# Patient Record
Sex: FEMALE | HISPANIC OR LATINO | Employment: UNEMPLOYED | URBAN - METROPOLITAN AREA
[De-identification: names, ages, dates, MRNs, and addresses within clinical notes are randomized per-mention and may not be internally consistent; named-entity substitution may affect disease eponyms.]

---

## 2020-09-08 ENCOUNTER — HOSPITAL ENCOUNTER (EMERGENCY)
Facility: CLINIC | Age: 50
Discharge: HOME | End: 2020-09-08
Attending: EMERGENCY MEDICINE | Admitting: EMERGENCY MEDICINE
Payer: COMMERCIAL

## 2020-09-08 ENCOUNTER — APPOINTMENT (OUTPATIENT)
Dept: CT IMAGING | Facility: CLINIC | Age: 50
End: 2020-09-08
Attending: EMERGENCY MEDICINE
Payer: COMMERCIAL

## 2020-09-08 VITALS
RESPIRATION RATE: 20 BRPM | DIASTOLIC BLOOD PRESSURE: 89 MMHG | WEIGHT: 240 LBS | OXYGEN SATURATION: 100 % | SYSTOLIC BLOOD PRESSURE: 148 MMHG | HEART RATE: 80 BPM | TEMPERATURE: 97.3 F

## 2020-09-08 DIAGNOSIS — N13.30 HYDRONEPHROSIS, UNSPECIFIED HYDRONEPHROSIS TYPE: ICD-10-CM

## 2020-09-08 DIAGNOSIS — N23 RENAL COLIC: ICD-10-CM

## 2020-09-08 LAB
ALBUMIN SERPL-MCNC: 4.1 G/DL (ref 3.4–5)
ALBUMIN UR-MCNC: NEGATIVE MG/DL
ALP SERPL-CCNC: 113 U/L (ref 40–150)
ALT SERPL W P-5'-P-CCNC: 24 U/L (ref 0–50)
ANION GAP SERPL CALCULATED.3IONS-SCNC: 6 MMOL/L (ref 3–14)
ANISOCYTOSIS BLD QL SMEAR: ABNORMAL
APPEARANCE UR: CLEAR
AST SERPL W P-5'-P-CCNC: 24 U/L (ref 0–45)
BACTERIA #/AREA URNS HPF: ABNORMAL /HPF
BASOPHILS # BLD AUTO: 0 10E9/L (ref 0–0.2)
BASOPHILS NFR BLD AUTO: 0.3 %
BILIRUB SERPL-MCNC: 0.3 MG/DL (ref 0.2–1.3)
BILIRUB UR QL STRIP: NEGATIVE
BUN SERPL-MCNC: 8 MG/DL (ref 7–30)
CALCIUM SERPL-MCNC: 8.8 MG/DL (ref 8.5–10.1)
CHLORIDE SERPL-SCNC: 107 MMOL/L (ref 94–109)
CO2 SERPL-SCNC: 25 MMOL/L (ref 20–32)
COLOR UR AUTO: ABNORMAL
CREAT SERPL-MCNC: 0.49 MG/DL (ref 0.52–1.04)
DIFFERENTIAL METHOD BLD: ABNORMAL
ELLIPTOCYTES BLD QL SMEAR: SLIGHT
EOSINOPHIL # BLD AUTO: 0.1 10E9/L (ref 0–0.7)
EOSINOPHIL NFR BLD AUTO: 0.7 %
ERYTHROCYTE [DISTWIDTH] IN BLOOD BY AUTOMATED COUNT: 20.9 % (ref 10–15)
GFR SERPL CREATININE-BSD FRML MDRD: >90 ML/MIN/{1.73_M2}
GLUCOSE SERPL-MCNC: 138 MG/DL (ref 70–99)
GLUCOSE UR STRIP-MCNC: NEGATIVE MG/DL
HCT VFR BLD AUTO: 38 % (ref 35–47)
HGB BLD-MCNC: 10.7 G/DL (ref 11.7–15.7)
HGB UR QL STRIP: NEGATIVE
HYPOCHROMIA BLD QL: PRESENT
IMM GRANULOCYTES # BLD: 0.1 10E9/L (ref 0–0.4)
IMM GRANULOCYTES NFR BLD: 0.5 %
KETONES UR STRIP-MCNC: NEGATIVE MG/DL
LEUKOCYTE ESTERASE UR QL STRIP: NEGATIVE
LYMPHOCYTES # BLD AUTO: 1.6 10E9/L (ref 0.8–5.3)
LYMPHOCYTES NFR BLD AUTO: 13.1 %
MCH RBC QN AUTO: 20.4 PG (ref 26.5–33)
MCHC RBC AUTO-ENTMCNC: 28.2 G/DL (ref 31.5–36.5)
MCV RBC AUTO: 72 FL (ref 78–100)
MICROCYTES BLD QL SMEAR: PRESENT
MONOCYTES # BLD AUTO: 0.6 10E9/L (ref 0–1.3)
MONOCYTES NFR BLD AUTO: 4.9 %
MUCOUS THREADS #/AREA URNS LPF: PRESENT /LPF
NEUTROPHILS # BLD AUTO: 9.5 10E9/L (ref 1.6–8.3)
NEUTROPHILS NFR BLD AUTO: 80.5 %
NITRATE UR QL: NEGATIVE
NRBC # BLD AUTO: 0 10*3/UL
NRBC BLD AUTO-RTO: 0 /100
PH UR STRIP: 7 PH (ref 5–7)
PLATELET # BLD AUTO: 434 10E9/L (ref 150–450)
PLATELET # BLD EST: ABNORMAL 10*3/UL
POTASSIUM SERPL-SCNC: 3.3 MMOL/L (ref 3.4–5.3)
PROT SERPL-MCNC: 8.2 G/DL (ref 6.8–8.8)
RBC # BLD AUTO: 5.25 10E12/L (ref 3.8–5.2)
RBC #/AREA URNS AUTO: 2 /HPF (ref 0–2)
SODIUM SERPL-SCNC: 138 MMOL/L (ref 133–144)
SOURCE: ABNORMAL
SP GR UR STRIP: 1.01 (ref 1–1.03)
SQUAMOUS #/AREA URNS AUTO: 1 /HPF (ref 0–1)
UROBILINOGEN UR STRIP-MCNC: NORMAL MG/DL (ref 0–2)
WBC # BLD AUTO: 11.8 10E9/L (ref 4–11)
WBC #/AREA URNS AUTO: 1 /HPF (ref 0–5)

## 2020-09-08 PROCEDURE — 87086 URINE CULTURE/COLONY COUNT: CPT | Performed by: EMERGENCY MEDICINE

## 2020-09-08 PROCEDURE — 99285 EMERGENCY DEPT VISIT HI MDM: CPT | Mod: 25

## 2020-09-08 PROCEDURE — 25000128 H RX IP 250 OP 636: Performed by: EMERGENCY MEDICINE

## 2020-09-08 PROCEDURE — 25800030 ZZH RX IP 258 OP 636: Performed by: EMERGENCY MEDICINE

## 2020-09-08 PROCEDURE — 96361 HYDRATE IV INFUSION ADD-ON: CPT

## 2020-09-08 PROCEDURE — 96374 THER/PROPH/DIAG INJ IV PUSH: CPT

## 2020-09-08 PROCEDURE — 80053 COMPREHEN METABOLIC PANEL: CPT | Performed by: EMERGENCY MEDICINE

## 2020-09-08 PROCEDURE — 96375 TX/PRO/DX INJ NEW DRUG ADDON: CPT

## 2020-09-08 PROCEDURE — 85025 COMPLETE CBC W/AUTO DIFF WBC: CPT | Performed by: EMERGENCY MEDICINE

## 2020-09-08 PROCEDURE — 74176 CT ABD & PELVIS W/O CONTRAST: CPT

## 2020-09-08 PROCEDURE — 81001 URINALYSIS AUTO W/SCOPE: CPT | Performed by: EMERGENCY MEDICINE

## 2020-09-08 RX ORDER — IBUPROFEN 600 MG/1
600 TABLET, FILM COATED ORAL EVERY 6 HOURS PRN
Qty: 30 TABLET | Refills: 1 | Status: ON HOLD | OUTPATIENT
Start: 2020-09-08 | End: 2020-12-16

## 2020-09-08 RX ORDER — ONDANSETRON 4 MG/1
4 TABLET, ORALLY DISINTEGRATING ORAL EVERY 8 HOURS PRN
Qty: 10 TABLET | Refills: 0 | Status: SHIPPED | OUTPATIENT
Start: 2020-09-08 | End: 2020-09-11

## 2020-09-08 RX ORDER — MORPHINE SULFATE 4 MG/ML
4 INJECTION, SOLUTION INTRAMUSCULAR; INTRAVENOUS
Status: DISCONTINUED | OUTPATIENT
Start: 2020-09-08 | End: 2020-09-08 | Stop reason: HOSPADM

## 2020-09-08 RX ORDER — ONDANSETRON 2 MG/ML
4 INJECTION INTRAMUSCULAR; INTRAVENOUS
Status: COMPLETED | OUTPATIENT
Start: 2020-09-08 | End: 2020-09-08

## 2020-09-08 RX ORDER — KETOROLAC TROMETHAMINE 15 MG/ML
15 INJECTION, SOLUTION INTRAMUSCULAR; INTRAVENOUS ONCE
Status: COMPLETED | OUTPATIENT
Start: 2020-09-08 | End: 2020-09-08

## 2020-09-08 RX ORDER — SODIUM CHLORIDE 9 MG/ML
INJECTION, SOLUTION INTRAVENOUS CONTINUOUS
Status: DISCONTINUED | OUTPATIENT
Start: 2020-09-08 | End: 2020-09-08 | Stop reason: HOSPADM

## 2020-09-08 RX ADMIN — ONDANSETRON 4 MG: 2 INJECTION INTRAMUSCULAR; INTRAVENOUS at 06:30

## 2020-09-08 RX ADMIN — KETOROLAC TROMETHAMINE 15 MG: 15 INJECTION, SOLUTION INTRAMUSCULAR; INTRAVENOUS at 07:50

## 2020-09-08 RX ADMIN — MORPHINE SULFATE 4 MG: 4 INJECTION INTRAVENOUS at 06:31

## 2020-09-08 RX ADMIN — SODIUM CHLORIDE 1000 ML: 9 INJECTION, SOLUTION INTRAVENOUS at 06:34

## 2020-09-08 ASSESSMENT — ENCOUNTER SYMPTOMS
NAUSEA: 1
CONSTIPATION: 0
BLOOD IN STOOL: 0
HEMATURIA: 0
DIARRHEA: 0
CHILLS: 0
FLANK PAIN: 1
VOMITING: 1
DYSURIA: 1
FEVER: 0

## 2020-09-08 NOTE — ED TRIAGE NOTES
Pt arrives with right flank pain that started 2 hours ago with nausea and vomiting.  Pain with urination as well that started this morning

## 2020-09-08 NOTE — ED PROVIDER NOTES
History     Chief Complaint:  Flank Pain       The history is provided by the patient.     Kathy Gaspar is a 50 year old female who presents for evaluation of acute onset of right flank pain radiating to her right abdomen.  She notes associated dysuria.  She denies hematuria or foul odor.  She notes nausea and vomiting when the pain becomes severe.  She denies fever or chills.  She notes the days leading up to this morning were normal for her.  She denies stool changes or vaginal symptoms.  She denies history of musculoskeletal back problems.  She denies history of similar pain in the past.  She has not had kidney stones or recurrent urinary tract infections.  She denies heavy lifting or straining or any trauma or injury.    Allergies:  No Known Drug Allergies    Medications:   The patient is not currently taking any prescribed medications.     Medical History:   Iron deficiency anemia    Surgical History   Appendectomy    Family History:   Mother -  Diabetes mellitus - type 2   Sister -  Diabetes mellitus - type 2     Social History:  The patient was unaccompanied to the ED.  Smoking Status: Never  Smokeless Tobacco: Never  Alcohol Use: No      Review of Systems   Constitutional: Negative for chills and fever.   Gastrointestinal: Positive for nausea and vomiting. Negative for blood in stool, constipation and diarrhea.   Genitourinary: Positive for dysuria and flank pain (right-sided). Negative for hematuria, vaginal bleeding, vaginal discharge and vaginal pain.        Negative for urine odor   All other systems reviewed and are negative.      Physical Exam     Patient Vitals for the past 24 hrs:   BP Temp Temp src Pulse Resp SpO2 Weight   09/08/20 0601 (!) 151/135 97.3  F (36.3  C) Temporal 74 20 100 % 108.9 kg (240 lb)          Physical Exam  General: Uncomfortable appearing adult female sitting upright  Eyes: PERRL, Conjunctive within normal limits  ENT: Moist mucous membranes, oropharynx clear.   CV:  Normal S1S2, no murmur, rub or gallop. Regular rate and rhythm  Resp: Clear to auscultation bilaterally, no wheezes, rales or rhonchi. Normal respiratory effort.  GI: Abdomen is soft, nontender and nondistended. No palpable masses. No rebound or guarding. CVA tenderness on the right to percussion.  MSK: No edema. Nontender. Normal active range of motion. No back tenderness to palpation.  Skin: Warm and dry. No rashes or lesions or ecchymoses on visible skin.  Neuro: Alert and oriented. Responds appropriately to all questions and commands. No focal findings appreciated. Normal muscle tone.  Psych: Appropriate mood and affect.     Emergency Department Course     Imaging:  Radiology results were communicated with the patient who voiced understanding of the findings.    CT Abdomen Pelvis w/o contrast:  IMPRESSION:   1.  Mild hydronephrosis and stranding on the right. Presumably related   to a 2 mm stone that is now in the bladder. Ultimately pyelonephritis   is not excluded, correlation with any signs of infection would be   needed to help exclude this.   2.  Otherwise no acute process demonstrated.   3.  Tiny hiatal hernia.   Reading per radiology.    Laboratory:  Laboratory findings were communicated with the patient who voiced understanding of the findings.    CBC: WBC 11.8 (H), HGB 10.7 (L),   CMP: Potassium 3.3 (L), Glucose 138 (H), Creatinine 0.49 (L) o/w WNL     UA with Microscopic: Bacteria few (A), Mucous present (A), o/w WNL   Urine Culture: Pending    Interventions:   0630 Zofran 4 mg IV   0631 Morphine 4 mg IV   0634 Normal Saline 1000 mL IV   0750 Toradol 15 mg IV     Emergency Department Course:    0611 Nursing notes and vitals reviewed.    0611 I performed an exam of the patient as documented above.     0623 IV was inserted and blood was drawn for laboratory testing, results above.    0648 The patient provided a urine sample here in the emergency department. This was sent for laboratory testing,  findings above.     0652 The patient was sent for CT while in the emergency department, results above.     Patient reassessed. She is feeling more comfortable, although notes mild ongoing pain.     0750 Findings and plan explained to the Patient. Patient discharged home with instructions regarding supportive care, medications, and reasons to return. The importance of close follow-up was reviewed. The patient was prescribed as below.     Impression & Plan     Medical Decision Making:  Kathy Gaspar is a 50 year old female who presented with acute onset of right flank pain radiating to her abdomen.  Differential included nephrolithiasis, pyelonephritis, AAA, appendicitis, diverticulitis, torsion.  CT confirms a 2mm bladder stone, presumably recently passed due to ongoing mild hydronephrosis and edema.  No clinical evidence of sepsis, urinary retention, renal failure, urinary tract infection, or pyelonephritis.  Urine culture pending due to bacteruria but active infection seems unlikely.  Her symptoms have been controlled with described interventions in the Emergency Department.     I have explained the importance of short term NSAID use in the management of this process. If this is a recently passed stone her symptoms should improve over the day. Zofran was prescribed to help ease her nausea from pain.  Opioids were not prescribed as the patient did not feel that she would require stronger pain control at home.    She understands to return to the ED with uncontrolled pain, vomiting, and fever. PCP and/or urology follow-up in the next 2 to 3 days with ongoing symptoms recommended.    Diagnosis:     ICD-10-CM    1. Renal colic  N23    2. Hydronephrosis, unspecified hydronephrosis type  N13.30         Disposition:  Discharged to home.    Discharge Medications:  New Prescriptions    IBUPROFEN (ADVIL/MOTRIN) 600 MG TABLET    Take 1 tablet (600 mg) by mouth every 6 hours as needed for moderate pain    ONDANSETRON  (ZOFRAN ODT) 4 MG ODT TAB    Take 1 tablet (4 mg) by mouth every 8 hours as needed for nausea or vomiting       Scribe Disclosure:  I, Deedee Krishnamurthy, am serving as a scribe at 6:12 AM on 9/8/2020 to document services personally performed by Brittany Hanna MD based on my observations and the provider's statements to me.        Brittany Hanna MD  09/08/20 0847

## 2020-09-08 NOTE — ED AVS SNAPSHOT
[facility] Emergency Department  [address]  [facility] [address]  Phone:  [phone]  Fax:  [phone]                                    Kathy Gaspar   MRN: [id number]    Department:  [facility] Emergency Department   Date of Visit:  9/8/2020           After Visit Summary Signature Page    I have received my discharge instructions, and my questions have been answered. I have discussed any challenges I see with this plan with the nurse or doctor.    ..........................................................................................................................................  Patient/Patient Representative Signature      ..........................................................................................................................................  Patient Representative Print Name and Relationship to Patient    ..................................................               ................................................  Date                                   Time    ..........................................................................................................................................  Reviewed by Signature/Title    ...................................................              ..............................................  Date                                               Time          22EPIC Rev 08/18

## 2020-09-09 LAB
BACTERIA SPEC CULT: NORMAL
Lab: NORMAL
SPECIMEN SOURCE: NORMAL

## 2020-12-15 ENCOUNTER — HOSPITAL ENCOUNTER (INPATIENT)
Facility: CLINIC | Age: 50
LOS: 1 days | Discharge: HOME | DRG: 418 | End: 2020-12-16
Attending: EMERGENCY MEDICINE | Admitting: INTERNAL MEDICINE
Payer: COMMERCIAL

## 2020-12-15 DIAGNOSIS — K81.0 ACUTE CHOLECYSTITIS: ICD-10-CM

## 2020-12-15 DIAGNOSIS — K80.20 GALLSTONES: Primary | ICD-10-CM

## 2020-12-15 PROCEDURE — 83605 ASSAY OF LACTIC ACID: CPT | Performed by: EMERGENCY MEDICINE

## 2020-12-15 PROCEDURE — 99285 EMERGENCY DEPT VISIT HI MDM: CPT | Mod: 25

## 2020-12-15 PROCEDURE — 96374 THER/PROPH/DIAG INJ IV PUSH: CPT | Mod: 59

## 2020-12-15 PROCEDURE — C9803 HOPD COVID-19 SPEC COLLECT: HCPCS

## 2020-12-15 PROCEDURE — 80053 COMPREHEN METABOLIC PANEL: CPT | Performed by: EMERGENCY MEDICINE

## 2020-12-15 PROCEDURE — 96375 TX/PRO/DX INJ NEW DRUG ADDON: CPT

## 2020-12-15 PROCEDURE — 83690 ASSAY OF LIPASE: CPT | Performed by: EMERGENCY MEDICINE

## 2020-12-15 PROCEDURE — 96376 TX/PRO/DX INJ SAME DRUG ADON: CPT

## 2020-12-15 PROCEDURE — 84484 ASSAY OF TROPONIN QUANT: CPT | Performed by: EMERGENCY MEDICINE

## 2020-12-15 PROCEDURE — 96361 HYDRATE IV INFUSION ADD-ON: CPT

## 2020-12-15 PROCEDURE — 85025 COMPLETE CBC W/AUTO DIFF WBC: CPT | Performed by: EMERGENCY MEDICINE

## 2020-12-15 RX ORDER — ONDANSETRON 2 MG/ML
4 INJECTION INTRAMUSCULAR; INTRAVENOUS EVERY 30 MIN PRN
Status: DISCONTINUED | OUTPATIENT
Start: 2020-12-15 | End: 2020-12-16

## 2020-12-15 RX ORDER — SODIUM CHLORIDE 9 MG/ML
INJECTION, SOLUTION INTRAVENOUS CONTINUOUS
Status: DISCONTINUED | OUTPATIENT
Start: 2020-12-16 | End: 2020-12-16 | Stop reason: HOSPADM

## 2020-12-15 RX ORDER — KETOROLAC TROMETHAMINE 30 MG/ML
30 INJECTION, SOLUTION INTRAMUSCULAR; INTRAVENOUS ONCE
Status: COMPLETED | OUTPATIENT
Start: 2020-12-15 | End: 2020-12-16

## 2020-12-15 RX ORDER — HYDROMORPHONE HYDROCHLORIDE 1 MG/ML
0.5 INJECTION, SOLUTION INTRAMUSCULAR; INTRAVENOUS; SUBCUTANEOUS
Status: COMPLETED | OUTPATIENT
Start: 2020-12-15 | End: 2020-12-16

## 2020-12-15 ASSESSMENT — ENCOUNTER SYMPTOMS
FLANK PAIN: 1
VOMITING: 1
BACK PAIN: 1

## 2020-12-16 ENCOUNTER — APPOINTMENT (OUTPATIENT)
Dept: ULTRASOUND IMAGING | Facility: CLINIC | Age: 50
DRG: 418 | End: 2020-12-16
Attending: EMERGENCY MEDICINE
Payer: COMMERCIAL

## 2020-12-16 ENCOUNTER — ANESTHESIA (OUTPATIENT)
Dept: SURGERY | Facility: CLINIC | Age: 50
DRG: 418 | End: 2020-12-16
Payer: COMMERCIAL

## 2020-12-16 ENCOUNTER — APPOINTMENT (OUTPATIENT)
Dept: SURGERY | Facility: PHYSICIAN GROUP | Age: 50
End: 2020-12-16
Payer: COMMERCIAL

## 2020-12-16 ENCOUNTER — APPOINTMENT (OUTPATIENT)
Dept: GENERAL RADIOLOGY | Facility: CLINIC | Age: 50
DRG: 418 | End: 2020-12-16
Attending: INTERNAL MEDICINE
Payer: COMMERCIAL

## 2020-12-16 ENCOUNTER — APPOINTMENT (OUTPATIENT)
Dept: CT IMAGING | Facility: CLINIC | Age: 50
DRG: 418 | End: 2020-12-16
Attending: EMERGENCY MEDICINE
Payer: COMMERCIAL

## 2020-12-16 ENCOUNTER — ANESTHESIA EVENT (OUTPATIENT)
Dept: SURGERY | Facility: CLINIC | Age: 50
DRG: 418 | End: 2020-12-16
Payer: COMMERCIAL

## 2020-12-16 ENCOUNTER — SURGERY (OUTPATIENT)
Age: 50
End: 2020-12-16
Payer: COMMERCIAL

## 2020-12-16 VITALS
OXYGEN SATURATION: 97 % | DIASTOLIC BLOOD PRESSURE: 96 MMHG | SYSTOLIC BLOOD PRESSURE: 172 MMHG | HEIGHT: 55 IN | WEIGHT: 194.7 LBS | HEART RATE: 82 BPM | RESPIRATION RATE: 16 BRPM | BODY MASS INDEX: 45.06 KG/M2 | TEMPERATURE: 98.7 F

## 2020-12-16 PROBLEM — K81.0 ACUTE CHOLECYSTITIS: Status: ACTIVE | Noted: 2020-12-16

## 2020-12-16 LAB
ALBUMIN SERPL-MCNC: 3.3 G/DL (ref 3.4–5)
ALBUMIN SERPL-MCNC: 3.9 G/DL (ref 3.4–5)
ALBUMIN UR-MCNC: 30 MG/DL
ALP SERPL-CCNC: 264 U/L (ref 40–150)
ALP SERPL-CCNC: 280 U/L (ref 40–150)
ALT SERPL W P-5'-P-CCNC: 574 U/L (ref 0–50)
ALT SERPL W P-5'-P-CCNC: 637 U/L (ref 0–50)
AMORPH CRY #/AREA URNS HPF: ABNORMAL /HPF
ANION GAP SERPL CALCULATED.3IONS-SCNC: 4 MMOL/L (ref 3–14)
APPEARANCE UR: ABNORMAL
AST SERPL W P-5'-P-CCNC: 1006 U/L (ref 0–45)
AST SERPL W P-5'-P-CCNC: 808 U/L (ref 0–45)
BASOPHILS # BLD AUTO: 0 10E9/L (ref 0–0.2)
BASOPHILS NFR BLD AUTO: 0.4 %
BILIRUB DIRECT SERPL-MCNC: 0.8 MG/DL (ref 0–0.2)
BILIRUB SERPL-MCNC: 1.4 MG/DL (ref 0.2–1.3)
BILIRUB SERPL-MCNC: 1.5 MG/DL (ref 0.2–1.3)
BILIRUB UR QL STRIP: NEGATIVE
BUN SERPL-MCNC: 9 MG/DL (ref 7–30)
CALCIUM SERPL-MCNC: 9.1 MG/DL (ref 8.5–10.1)
CHLORIDE SERPL-SCNC: 108 MMOL/L (ref 94–109)
CO2 SERPL-SCNC: 25 MMOL/L (ref 20–32)
COLOR UR AUTO: YELLOW
CREAT SERPL-MCNC: 0.39 MG/DL (ref 0.52–1.04)
DIFFERENTIAL METHOD BLD: ABNORMAL
EOSINOPHIL # BLD AUTO: 0 10E9/L (ref 0–0.7)
EOSINOPHIL NFR BLD AUTO: 0.5 %
ERYTHROCYTE [DISTWIDTH] IN BLOOD BY AUTOMATED COUNT: 18.3 % (ref 10–15)
GFR SERPL CREATININE-BSD FRML MDRD: >90 ML/MIN/{1.73_M2}
GLUCOSE SERPL-MCNC: 120 MG/DL (ref 70–99)
GLUCOSE UR STRIP-MCNC: NEGATIVE MG/DL
HCT VFR BLD AUTO: 39.6 % (ref 35–47)
HGB BLD-MCNC: 12.2 G/DL (ref 11.7–15.7)
HGB UR QL STRIP: NEGATIVE
IMM GRANULOCYTES # BLD: 0 10E9/L (ref 0–0.4)
IMM GRANULOCYTES NFR BLD: 0.3 %
INTERPRETATION ECG - MUSE: NORMAL
INTERPRETATION ECG - MUSE: NORMAL
KETONES UR STRIP-MCNC: 20 MG/DL
LABORATORY COMMENT REPORT: NORMAL
LACTATE BLD-SCNC: 1.4 MMOL/L (ref 0.7–2)
LEUKOCYTE ESTERASE UR QL STRIP: NEGATIVE
LIPASE SERPL-CCNC: 144 U/L (ref 73–393)
LIPASE SERPL-CCNC: 94 U/L (ref 73–393)
LYMPHOCYTES # BLD AUTO: 1.4 10E9/L (ref 0.8–5.3)
LYMPHOCYTES NFR BLD AUTO: 17.4 %
MCH RBC QN AUTO: 23 PG (ref 26.5–33)
MCHC RBC AUTO-ENTMCNC: 30.8 G/DL (ref 31.5–36.5)
MCV RBC AUTO: 75 FL (ref 78–100)
MONOCYTES # BLD AUTO: 0.8 10E9/L (ref 0–1.3)
MONOCYTES NFR BLD AUTO: 10.4 %
NEUTROPHILS # BLD AUTO: 5.6 10E9/L (ref 1.6–8.3)
NEUTROPHILS NFR BLD AUTO: 71 %
NITRATE UR QL: NEGATIVE
NRBC # BLD AUTO: 0 10*3/UL
NRBC BLD AUTO-RTO: 0 /100
PH UR STRIP: 8 PH (ref 5–7)
PLATELET # BLD AUTO: 458 10E9/L (ref 150–450)
POTASSIUM SERPL-SCNC: 4.2 MMOL/L (ref 3.4–5.3)
PROT SERPL-MCNC: 7.2 G/DL (ref 6.8–8.8)
PROT SERPL-MCNC: 8.5 G/DL (ref 6.8–8.8)
RBC # BLD AUTO: 5.3 10E12/L (ref 3.8–5.2)
RBC #/AREA URNS AUTO: 0 /HPF (ref 0–2)
SARS-COV-2 RNA SPEC QL NAA+PROBE: NEGATIVE
SARS-COV-2 RNA SPEC QL NAA+PROBE: NORMAL
SODIUM SERPL-SCNC: 137 MMOL/L (ref 133–144)
SOURCE: ABNORMAL
SP GR UR STRIP: 1.01 (ref 1–1.03)
SPECIMEN SOURCE: NORMAL
SPECIMEN SOURCE: NORMAL
TROPONIN I SERPL-MCNC: <0.015 UG/L (ref 0–0.04)
UROBILINOGEN UR STRIP-MCNC: NORMAL MG/DL (ref 0–2)
WBC # BLD AUTO: 7.9 10E9/L (ref 4–11)
WBC #/AREA URNS AUTO: 0 /HPF (ref 0–5)

## 2020-12-16 PROCEDURE — BF101ZZ FLUOROSCOPY OF BILE DUCTS USING LOW OSMOLAR CONTRAST: ICD-10-PCS | Performed by: SURGERY

## 2020-12-16 PROCEDURE — 250N000011 HC RX IP 250 OP 636: Performed by: INTERNAL MEDICINE

## 2020-12-16 PROCEDURE — 250N000011 HC RX IP 250 OP 636: Performed by: ANESTHESIOLOGY

## 2020-12-16 PROCEDURE — 360N000021 HC SURGERY LEVEL 3 EA 15 ADDTL MIN: Performed by: SURGERY

## 2020-12-16 PROCEDURE — 250N000011 HC RX IP 250 OP 636: Performed by: EMERGENCY MEDICINE

## 2020-12-16 PROCEDURE — 80076 HEPATIC FUNCTION PANEL: CPT | Performed by: SURGERY

## 2020-12-16 PROCEDURE — 250N000011 HC RX IP 250 OP 636: Performed by: SURGERY

## 2020-12-16 PROCEDURE — U0003 INFECTIOUS AGENT DETECTION BY NUCLEIC ACID (DNA OR RNA); SEVERE ACUTE RESPIRATORY SYNDROME CORONAVIRUS 2 (SARS-COV-2) (CORONAVIRUS DISEASE [COVID-19]), AMPLIFIED PROBE TECHNIQUE, MAKING USE OF HIGH THROUGHPUT TECHNOLOGIES AS DESCRIBED BY CMS-2020-01-R: HCPCS | Performed by: EMERGENCY MEDICINE

## 2020-12-16 PROCEDURE — 250N000011 HC RX IP 250 OP 636: Performed by: NURSE ANESTHETIST, CERTIFIED REGISTERED

## 2020-12-16 PROCEDURE — 0FT44ZZ RESECTION OF GALLBLADDER, PERCUTANEOUS ENDOSCOPIC APPROACH: ICD-10-PCS | Performed by: SURGERY

## 2020-12-16 PROCEDURE — 999N000136 HC STATISTIC PRE PROC ASSESS II: Performed by: SURGERY

## 2020-12-16 PROCEDURE — 74177 CT ABD & PELVIS W/CONTRAST: CPT

## 2020-12-16 PROCEDURE — 76705 ECHO EXAM OF ABDOMEN: CPT

## 2020-12-16 PROCEDURE — 761N000007 HC RECOVERY PHASE 2 EACH 15 MINS: Performed by: SURGERY

## 2020-12-16 PROCEDURE — 250N000009 HC RX 250: Performed by: EMERGENCY MEDICINE

## 2020-12-16 PROCEDURE — 250N000009 HC RX 250: Performed by: NURSE ANESTHETIST, CERTIFIED REGISTERED

## 2020-12-16 PROCEDURE — 258N000003 HC RX IP 258 OP 636: Performed by: EMERGENCY MEDICINE

## 2020-12-16 PROCEDURE — 360N000024 HC SURGERY LEVEL 3 W FLUORO 1ST 30 MIN: Performed by: SURGERY

## 2020-12-16 PROCEDURE — 250N000009 HC RX 250: Performed by: SURGERY

## 2020-12-16 PROCEDURE — 120N000001 HC R&B MED SURG/OB

## 2020-12-16 PROCEDURE — 99223 1ST HOSP IP/OBS HIGH 75: CPT | Mod: AI | Performed by: INTERNAL MEDICINE

## 2020-12-16 PROCEDURE — 370N000001 HC ANESTHESIA TECHNICAL FEE, 1ST 30 MIN: Performed by: SURGERY

## 2020-12-16 PROCEDURE — 272N000001 HC OR GENERAL SUPPLY STERILE: Performed by: SURGERY

## 2020-12-16 PROCEDURE — 99205 OFFICE O/P NEW HI 60 MIN: CPT | Mod: 57 | Performed by: SURGERY

## 2020-12-16 PROCEDURE — 88304 TISSUE EXAM BY PATHOLOGIST: CPT | Mod: TC | Performed by: SURGERY

## 2020-12-16 PROCEDURE — 999N000179 XR SURGERY CARM FLUORO LESS THAN 5 MIN W STILLS

## 2020-12-16 PROCEDURE — 88304 TISSUE EXAM BY PATHOLOGIST: CPT | Mod: 26

## 2020-12-16 PROCEDURE — 370N000002 HC ANESTHESIA TECHNICAL FEE, EACH ADDTL 15 MIN: Performed by: SURGERY

## 2020-12-16 PROCEDURE — 47563 LAPARO CHOLECYSTECTOMY/GRAPH: CPT | Mod: AS | Performed by: PHYSICIAN ASSISTANT

## 2020-12-16 PROCEDURE — 36415 COLL VENOUS BLD VENIPUNCTURE: CPT | Performed by: SURGERY

## 2020-12-16 PROCEDURE — 761N000002 HC RECOVERY PHASE 1 LEVEL 1 EA ADDTL HR: Performed by: SURGERY

## 2020-12-16 PROCEDURE — 761N000001 HC RECOVERY PHASE 1 LEVEL 1 FIRST HR: Performed by: SURGERY

## 2020-12-16 PROCEDURE — 47563 LAPARO CHOLECYSTECTOMY/GRAPH: CPT | Performed by: SURGERY

## 2020-12-16 PROCEDURE — 81001 URINALYSIS AUTO W/SCOPE: CPT | Performed by: EMERGENCY MEDICINE

## 2020-12-16 PROCEDURE — 36415 COLL VENOUS BLD VENIPUNCTURE: CPT | Performed by: INTERNAL MEDICINE

## 2020-12-16 PROCEDURE — 258N000003 HC RX IP 258 OP 636: Performed by: ANESTHESIOLOGY

## 2020-12-16 PROCEDURE — 83690 ASSAY OF LIPASE: CPT | Performed by: SURGERY

## 2020-12-16 PROCEDURE — 255N000002 HC RX 255 OP 636: Performed by: SURGERY

## 2020-12-16 PROCEDURE — 258N000003 HC RX IP 258 OP 636: Performed by: INTERNAL MEDICINE

## 2020-12-16 RX ORDER — FENTANYL CITRATE 50 UG/ML
INJECTION, SOLUTION INTRAMUSCULAR; INTRAVENOUS PRN
Status: DISCONTINUED | OUTPATIENT
Start: 2020-12-16 | End: 2020-12-16

## 2020-12-16 RX ORDER — OXYCODONE HYDROCHLORIDE 5 MG/1
5 TABLET ORAL
Status: DISCONTINUED | OUTPATIENT
Start: 2020-12-16 | End: 2020-12-16 | Stop reason: HOSPADM

## 2020-12-16 RX ORDER — HYDRALAZINE HYDROCHLORIDE 20 MG/ML
2.5-5 INJECTION INTRAMUSCULAR; INTRAVENOUS EVERY 10 MIN PRN
Status: DISCONTINUED | OUTPATIENT
Start: 2020-12-16 | End: 2020-12-16 | Stop reason: HOSPADM

## 2020-12-16 RX ORDER — HYDROMORPHONE HYDROCHLORIDE 1 MG/ML
0.5 INJECTION, SOLUTION INTRAMUSCULAR; INTRAVENOUS; SUBCUTANEOUS
Status: DISCONTINUED | OUTPATIENT
Start: 2020-12-16 | End: 2020-12-16

## 2020-12-16 RX ORDER — ONDANSETRON 4 MG/1
4 TABLET, ORALLY DISINTEGRATING ORAL EVERY 6 HOURS PRN
Status: DISCONTINUED | OUTPATIENT
Start: 2020-12-16 | End: 2020-12-16 | Stop reason: HOSPADM

## 2020-12-16 RX ORDER — BUPIVACAINE HYDROCHLORIDE AND EPINEPHRINE 2.5; 5 MG/ML; UG/ML
30 INJECTION, SOLUTION EPIDURAL; INFILTRATION; INTRACAUDAL; PERINEURAL ONCE
Status: DISCONTINUED | OUTPATIENT
Start: 2020-12-16 | End: 2020-12-16 | Stop reason: HOSPADM

## 2020-12-16 RX ORDER — HYDROMORPHONE HYDROCHLORIDE 1 MG/ML
.3-.5 INJECTION, SOLUTION INTRAMUSCULAR; INTRAVENOUS; SUBCUTANEOUS
Status: DISCONTINUED | OUTPATIENT
Start: 2020-12-16 | End: 2020-12-16 | Stop reason: HOSPADM

## 2020-12-16 RX ORDER — ONDANSETRON 2 MG/ML
4 INJECTION INTRAMUSCULAR; INTRAVENOUS EVERY 30 MIN PRN
Status: DISCONTINUED | OUTPATIENT
Start: 2020-12-16 | End: 2020-12-16 | Stop reason: HOSPADM

## 2020-12-16 RX ORDER — LIDOCAINE 40 MG/G
CREAM TOPICAL
Status: DISCONTINUED | OUTPATIENT
Start: 2020-12-16 | End: 2020-12-16 | Stop reason: HOSPADM

## 2020-12-16 RX ORDER — OXYCODONE HYDROCHLORIDE 5 MG/1
5 TABLET ORAL EVERY 4 HOURS PRN
Status: DISCONTINUED | OUTPATIENT
Start: 2020-12-16 | End: 2020-12-16 | Stop reason: HOSPADM

## 2020-12-16 RX ORDER — LIDOCAINE HYDROCHLORIDE 10 MG/ML
INJECTION, SOLUTION INFILTRATION; PERINEURAL PRN
Status: DISCONTINUED | OUTPATIENT
Start: 2020-12-16 | End: 2020-12-16

## 2020-12-16 RX ORDER — IOPAMIDOL 755 MG/ML
500 INJECTION, SOLUTION INTRAVASCULAR ONCE
Status: COMPLETED | OUTPATIENT
Start: 2020-12-16 | End: 2020-12-16

## 2020-12-16 RX ORDER — KETOROLAC TROMETHAMINE 30 MG/ML
30 INJECTION, SOLUTION INTRAMUSCULAR; INTRAVENOUS EVERY 6 HOURS PRN
Status: DISCONTINUED | OUTPATIENT
Start: 2020-12-16 | End: 2020-12-16 | Stop reason: HOSPADM

## 2020-12-16 RX ORDER — LABETALOL HYDROCHLORIDE 5 MG/ML
INJECTION, SOLUTION INTRAVENOUS PRN
Status: DISCONTINUED | OUTPATIENT
Start: 2020-12-16 | End: 2020-12-16

## 2020-12-16 RX ORDER — AMOXICILLIN 250 MG
1-2 CAPSULE ORAL 2 TIMES DAILY
Qty: 30 TABLET | Refills: 0 | Status: SHIPPED | OUTPATIENT
Start: 2020-12-16

## 2020-12-16 RX ORDER — ONDANSETRON 2 MG/ML
INJECTION INTRAMUSCULAR; INTRAVENOUS PRN
Status: DISCONTINUED | OUTPATIENT
Start: 2020-12-16 | End: 2020-12-16

## 2020-12-16 RX ORDER — PROPOFOL 10 MG/ML
INJECTION, EMULSION INTRAVENOUS CONTINUOUS PRN
Status: DISCONTINUED | OUTPATIENT
Start: 2020-12-16 | End: 2020-12-16

## 2020-12-16 RX ORDER — ALBUTEROL SULFATE 0.83 MG/ML
2.5 SOLUTION RESPIRATORY (INHALATION) EVERY 4 HOURS PRN
Status: DISCONTINUED | OUTPATIENT
Start: 2020-12-16 | End: 2020-12-16 | Stop reason: HOSPADM

## 2020-12-16 RX ORDER — CEFAZOLIN SODIUM 1 G/3ML
1 INJECTION, POWDER, FOR SOLUTION INTRAMUSCULAR; INTRAVENOUS SEE ADMIN INSTRUCTIONS
Status: DISCONTINUED | OUTPATIENT
Start: 2020-12-16 | End: 2020-12-16 | Stop reason: HOSPADM

## 2020-12-16 RX ORDER — BUPIVACAINE HYDROCHLORIDE AND EPINEPHRINE 5; 5 MG/ML; UG/ML
1 INJECTION, SOLUTION PERINEURAL ONCE
Status: DISCONTINUED | OUTPATIENT
Start: 2020-12-16 | End: 2020-12-16 | Stop reason: CLARIF

## 2020-12-16 RX ORDER — FENTANYL CITRATE 50 UG/ML
25-50 INJECTION, SOLUTION INTRAMUSCULAR; INTRAVENOUS
Status: DISCONTINUED | OUTPATIENT
Start: 2020-12-16 | End: 2020-12-16 | Stop reason: HOSPADM

## 2020-12-16 RX ORDER — MEPERIDINE HYDROCHLORIDE 25 MG/ML
12.5 INJECTION INTRAMUSCULAR; INTRAVENOUS; SUBCUTANEOUS
Status: DISCONTINUED | OUTPATIENT
Start: 2020-12-16 | End: 2020-12-16 | Stop reason: HOSPADM

## 2020-12-16 RX ORDER — ACETAMINOPHEN 325 MG/1
650 TABLET ORAL
Status: DISCONTINUED | OUTPATIENT
Start: 2020-12-16 | End: 2020-12-16 | Stop reason: HOSPADM

## 2020-12-16 RX ORDER — SODIUM CHLORIDE, SODIUM LACTATE, POTASSIUM CHLORIDE, CALCIUM CHLORIDE 600; 310; 30; 20 MG/100ML; MG/100ML; MG/100ML; MG/100ML
INJECTION, SOLUTION INTRAVENOUS CONTINUOUS
Status: DISCONTINUED | OUTPATIENT
Start: 2020-12-16 | End: 2020-12-16 | Stop reason: HOSPADM

## 2020-12-16 RX ORDER — GLYCOPYRROLATE 0.2 MG/ML
INJECTION, SOLUTION INTRAMUSCULAR; INTRAVENOUS PRN
Status: DISCONTINUED | OUTPATIENT
Start: 2020-12-16 | End: 2020-12-16

## 2020-12-16 RX ORDER — BUPIVACAINE HYDROCHLORIDE AND EPINEPHRINE 2.5; 5 MG/ML; UG/ML
INJECTION, SOLUTION INFILTRATION; PERINEURAL PRN
Status: DISCONTINUED | OUTPATIENT
Start: 2020-12-16 | End: 2020-12-16 | Stop reason: HOSPADM

## 2020-12-16 RX ORDER — NALOXONE HYDROCHLORIDE 0.4 MG/ML
0.4 INJECTION, SOLUTION INTRAMUSCULAR; INTRAVENOUS; SUBCUTANEOUS
Status: DISCONTINUED | OUTPATIENT
Start: 2020-12-16 | End: 2020-12-16 | Stop reason: HOSPADM

## 2020-12-16 RX ORDER — BUPIVACAINE HYDROCHLORIDE AND EPINEPHRINE 5; 5 MG/ML; UG/ML
30 INJECTION, SOLUTION PERINEURAL ONCE
Status: DISCONTINUED | OUTPATIENT
Start: 2020-12-16 | End: 2020-12-16 | Stop reason: CLARIF

## 2020-12-16 RX ORDER — ONDANSETRON 4 MG/1
4 TABLET, ORALLY DISINTEGRATING ORAL EVERY 30 MIN PRN
Status: DISCONTINUED | OUTPATIENT
Start: 2020-12-16 | End: 2020-12-16 | Stop reason: HOSPADM

## 2020-12-16 RX ORDER — DIAZEPAM 10 MG/2ML
2.5 INJECTION, SOLUTION INTRAMUSCULAR; INTRAVENOUS
Status: DISCONTINUED | OUTPATIENT
Start: 2020-12-16 | End: 2020-12-16 | Stop reason: HOSPADM

## 2020-12-16 RX ORDER — ACETAMINOPHEN 325 MG/1
650 TABLET ORAL ONCE
Status: DISCONTINUED | OUTPATIENT
Start: 2020-12-16 | End: 2020-12-16 | Stop reason: HOSPADM

## 2020-12-16 RX ORDER — NALOXONE HYDROCHLORIDE 0.4 MG/ML
0.2 INJECTION, SOLUTION INTRAMUSCULAR; INTRAVENOUS; SUBCUTANEOUS
Status: DISCONTINUED | OUTPATIENT
Start: 2020-12-16 | End: 2020-12-16 | Stop reason: HOSPADM

## 2020-12-16 RX ORDER — SODIUM CHLORIDE 9 MG/ML
INJECTION, SOLUTION INTRAVENOUS CONTINUOUS
Status: DISCONTINUED | OUTPATIENT
Start: 2020-12-16 | End: 2020-12-16

## 2020-12-16 RX ORDER — ONDANSETRON 2 MG/ML
4 INJECTION INTRAMUSCULAR; INTRAVENOUS EVERY 30 MIN PRN
Status: DISCONTINUED | OUTPATIENT
Start: 2020-12-16 | End: 2020-12-16

## 2020-12-16 RX ORDER — LABETALOL 20 MG/4 ML (5 MG/ML) INTRAVENOUS SYRINGE
10
Status: DISCONTINUED | OUTPATIENT
Start: 2020-12-16 | End: 2020-12-16 | Stop reason: HOSPADM

## 2020-12-16 RX ORDER — CEFAZOLIN SODIUM 2 G/100ML
2 INJECTION, SOLUTION INTRAVENOUS
Status: COMPLETED | OUTPATIENT
Start: 2020-12-16 | End: 2020-12-16

## 2020-12-16 RX ORDER — PROPOFOL 10 MG/ML
INJECTION, EMULSION INTRAVENOUS PRN
Status: DISCONTINUED | OUTPATIENT
Start: 2020-12-16 | End: 2020-12-16

## 2020-12-16 RX ORDER — HYDROMORPHONE HYDROCHLORIDE 1 MG/ML
.3-.5 INJECTION, SOLUTION INTRAMUSCULAR; INTRAVENOUS; SUBCUTANEOUS EVERY 10 MIN PRN
Status: DISCONTINUED | OUTPATIENT
Start: 2020-12-16 | End: 2020-12-16 | Stop reason: HOSPADM

## 2020-12-16 RX ORDER — OXYCODONE HYDROCHLORIDE 5 MG/1
5-10 TABLET ORAL EVERY 4 HOURS PRN
Qty: 10 TABLET | Refills: 0 | Status: SHIPPED | OUTPATIENT
Start: 2020-12-16

## 2020-12-16 RX ORDER — NEOSTIGMINE METHYLSULFATE 1 MG/ML
VIAL (ML) INJECTION PRN
Status: DISCONTINUED | OUTPATIENT
Start: 2020-12-16 | End: 2020-12-16

## 2020-12-16 RX ORDER — BUPIVACAINE HYDROCHLORIDE AND EPINEPHRINE 2.5; 5 MG/ML; UG/ML
30 INJECTION, SOLUTION EPIDURAL; INFILTRATION; INTRACAUDAL; PERINEURAL ONCE
Status: DISCONTINUED | OUTPATIENT
Start: 2020-12-16 | End: 2020-12-16 | Stop reason: CLARIF

## 2020-12-16 RX ORDER — ONDANSETRON 2 MG/ML
4 INJECTION INTRAMUSCULAR; INTRAVENOUS EVERY 6 HOURS PRN
Status: DISCONTINUED | OUTPATIENT
Start: 2020-12-16 | End: 2020-12-16 | Stop reason: HOSPADM

## 2020-12-16 RX ORDER — DEXAMETHASONE SODIUM PHOSPHATE 4 MG/ML
INJECTION, SOLUTION INTRA-ARTICULAR; INTRALESIONAL; INTRAMUSCULAR; INTRAVENOUS; SOFT TISSUE PRN
Status: DISCONTINUED | OUTPATIENT
Start: 2020-12-16 | End: 2020-12-16

## 2020-12-16 RX ADMIN — ROCURONIUM BROMIDE 30 MG: 10 INJECTION INTRAVENOUS at 13:07

## 2020-12-16 RX ADMIN — PROPOFOL 200 MG: 10 INJECTION, EMULSION INTRAVENOUS at 13:07

## 2020-12-16 RX ADMIN — KETOROLAC TROMETHAMINE 30 MG: 30 INJECTION, SOLUTION INTRAMUSCULAR at 00:09

## 2020-12-16 RX ADMIN — FENTANYL CITRATE 50 MCG: 50 INJECTION, SOLUTION INTRAMUSCULAR; INTRAVENOUS at 13:28

## 2020-12-16 RX ADMIN — HYDROMORPHONE HYDROCHLORIDE 0.5 MG: 1 INJECTION, SOLUTION INTRAMUSCULAR; INTRAVENOUS; SUBCUTANEOUS at 00:09

## 2020-12-16 RX ADMIN — GLYCOPYRROLATE 0.6 MG: 0.2 INJECTION, SOLUTION INTRAMUSCULAR; INTRAVENOUS at 14:32

## 2020-12-16 RX ADMIN — KETOROLAC TROMETHAMINE 30 MG: 30 INJECTION, SOLUTION INTRAMUSCULAR at 15:37

## 2020-12-16 RX ADMIN — Medication 4 MG: at 14:32

## 2020-12-16 RX ADMIN — TAZOBACTAM SODIUM AND PIPERACILLIN SODIUM 3.38 G: 375; 3 INJECTION, SOLUTION INTRAVENOUS at 09:29

## 2020-12-16 RX ADMIN — SODIUM CHLORIDE, POTASSIUM CHLORIDE, SODIUM LACTATE AND CALCIUM CHLORIDE: 600; 310; 30; 20 INJECTION, SOLUTION INTRAVENOUS at 13:01

## 2020-12-16 RX ADMIN — PROCHLORPERAZINE EDISYLATE 5 MG: 5 INJECTION INTRAMUSCULAR; INTRAVENOUS at 15:30

## 2020-12-16 RX ADMIN — PROCHLORPERAZINE EDISYLATE 5 MG: 5 INJECTION INTRAMUSCULAR; INTRAVENOUS at 05:05

## 2020-12-16 RX ADMIN — ONDANSETRON HYDROCHLORIDE 4 MG: 2 INJECTION, SOLUTION INTRAVENOUS at 14:09

## 2020-12-16 RX ADMIN — BUPIVACAINE HYDROCHLORIDE AND EPINEPHRINE BITARTRATE 30 ML: 2.5; .005 INJECTION, SOLUTION EPIDURAL; INFILTRATION; INTRACAUDAL; PERINEURAL at 14:42

## 2020-12-16 RX ADMIN — LIDOCAINE HYDROCHLORIDE 50 MG: 10 INJECTION, SOLUTION INFILTRATION; PERINEURAL at 13:07

## 2020-12-16 RX ADMIN — FENTANYL CITRATE 50 MCG: 50 INJECTION, SOLUTION INTRAMUSCULAR; INTRAVENOUS at 13:19

## 2020-12-16 RX ADMIN — PROPOFOL 50 MCG/KG/MIN: 10 INJECTION, EMULSION INTRAVENOUS at 13:27

## 2020-12-16 RX ADMIN — ROCURONIUM BROMIDE 20 MG: 10 INJECTION INTRAVENOUS at 13:18

## 2020-12-16 RX ADMIN — ONDANSETRON HYDROCHLORIDE 4 MG: 2 INJECTION, SOLUTION INTRAMUSCULAR; INTRAVENOUS at 15:11

## 2020-12-16 RX ADMIN — SODIUM CHLORIDE 15 ML GIVEN: 900 IRRIGANT IRRIGATION at 14:08

## 2020-12-16 RX ADMIN — SODIUM CHLORIDE 1000 ML: 9 INJECTION, SOLUTION INTRAVENOUS at 00:09

## 2020-12-16 RX ADMIN — CEFAZOLIN SODIUM 2 G: 2 INJECTION, SOLUTION INTRAVENOUS at 13:06

## 2020-12-16 RX ADMIN — TAZOBACTAM SODIUM AND PIPERACILLIN SODIUM 3.38 G: 375; 3 INJECTION, SOLUTION INTRAVENOUS at 02:39

## 2020-12-16 RX ADMIN — LABETALOL HYDROCHLORIDE 5 MG: 5 INJECTION INTRAVENOUS at 13:32

## 2020-12-16 RX ADMIN — IOPAMIDOL 100 ML: 755 INJECTION, SOLUTION INTRAVENOUS at 00:18

## 2020-12-16 RX ADMIN — HYDROMORPHONE HYDROCHLORIDE 0.5 MG: 1 INJECTION, SOLUTION INTRAMUSCULAR; INTRAVENOUS; SUBCUTANEOUS at 02:20

## 2020-12-16 RX ADMIN — DEXAMETHASONE SODIUM PHOSPHATE 4 MG: 4 INJECTION, SOLUTION INTRA-ARTICULAR; INTRALESIONAL; INTRAMUSCULAR; INTRAVENOUS; SOFT TISSUE at 13:07

## 2020-12-16 RX ADMIN — ONDANSETRON 4 MG: 2 INJECTION INTRAMUSCULAR; INTRAVENOUS at 03:42

## 2020-12-16 RX ADMIN — FENTANYL CITRATE 50 MCG: 50 INJECTION, SOLUTION INTRAMUSCULAR; INTRAVENOUS at 14:27

## 2020-12-16 RX ADMIN — FENTANYL CITRATE 100 MCG: 50 INJECTION, SOLUTION INTRAMUSCULAR; INTRAVENOUS at 13:07

## 2020-12-16 RX ADMIN — ONDANSETRON 4 MG: 2 INJECTION INTRAMUSCULAR; INTRAVENOUS at 00:09

## 2020-12-16 RX ADMIN — MIDAZOLAM 2 MG: 1 INJECTION INTRAMUSCULAR; INTRAVENOUS at 13:01

## 2020-12-16 RX ADMIN — ONDANSETRON 4 MG: 2 INJECTION INTRAMUSCULAR; INTRAVENOUS at 02:20

## 2020-12-16 RX ADMIN — SODIUM CHLORIDE 65 ML: 9 INJECTION, SOLUTION INTRAVENOUS at 00:18

## 2020-12-16 RX ADMIN — SODIUM CHLORIDE: 9 INJECTION, SOLUTION INTRAVENOUS at 03:40

## 2020-12-16 ASSESSMENT — MIFFLIN-ST. JEOR: SCORE: 1303.53

## 2020-12-16 ASSESSMENT — ACTIVITIES OF DAILY LIVING (ADL)
ADLS_ACUITY_SCORE: 14

## 2020-12-16 NOTE — OP NOTE
[facility] General Surgery Operative Note    Pre-operative diagnosis: symptomatic gallstones and possibly passed a common duct stone or sludge   Post-operative diagnosis: same, no evidence of choledocholithiasis and normal intraoperative cholangiogram   Procedure: laparoscopic cholecystectomy  intraoperative cholangiogram   Surgeon: Kinga Lunfsord MD   Assistant(s): Anupam Rosenbaum PA-C  The Physician Assistant was medically necessary for their expertise in prepping, camera management, suctioning, suturing and retraction.   Anesthesia: general   Estimated blood loss:  Specimen: 100 cc  gallbladder and contents               INDICATION FOR OPERATION: This is a 50 year old female who presented to the hospital yesterday with abdominal pain. Studies including ultrasound were consistent with gallstones, possible cholecystitis with 6mm CBD and elevated LFTs (transaminases markedly elevated, mild elevation of alk phos and bilirubin). She felt better this morning and gastroenterology assessed her as well with no plans for ERCP, thinking she likely passed a common duct stone. We discussed laparoscopic cholecystectomy with cholangiograms and the patient agreed to proceed after hearing the risks and benefits.    DESCRIPTION OF PROCEDURE:  The patient was taken to the operating room and placed on the table in supine position.  General endotracheal anesthesia was induced and the abdomen was prepped and draped in standard sterile fashion.  An incision above the umbilicus was made with a blade.  The incision was carried down to the fascia. The fascia was elevated with kocher clamps and the fascia was incised in the midline with a blade.  The peritoneum was entered sharply.  0 Vicryl suture was placed at the extremes of the fascial incision.  The Saritha trocar was introduced and the abdomen was insufflated with CO2.  A 5 mm trocar was placed in the subxiphoid position.  A 5 mm trocar was placed in the right upper quadrant,  just below the costal margin at the midclavicular line.  Another was placed at the anterior axillary line just below the costal margin on the right.  The patient was placed in reverse Trendelenburg and right side up.  The gallbladder appeared normal without inflammation.  The fundus of the gallbladder was grasped and retracted cephalad.  The infundibulum was grasped and retracted laterally. Large stones could be felt in the body of the gallbladder.  The peritoneum over the medial and lateral aspects of the triangle of Calot was taken down with the Maryland dissector and modest amounts of Bovie electrocautery.  The cystic duct and artery were freed up from surrounding tissues.  The triangle of Calot was skeletonized revealing the critical view of safety.    The cystic artery was very small and avulsed at this point and there was brisk pulsatile bleeding from the cystic duct stump and blood obscured our camera view. After multiple attempts of cleaning the camera, this was controlled by grasping with a maryland grasper. We placed an additional 5mm port in the right abdomen once we had obtained control of the bleeding. After using suction to clear out the blood and some clot, the maryland appeared to be partially across the right or main hepatic artery and the stump of the cystic artery could be seen. This was grasped and a clip was placed across the base of this cystic artery stump. The bleeding was then controlled with pressure released.    Intraoperative cholangiogram was then completed. A Morrow clamp was placed on the infundibulum and the catheter advanced into the cystic duct. The duct was flushed with saline without leakage. Cholangiogram was then completed with complete filling of bilateral hepatic ducts and common bile duct with emptying into the duodenum and no evidence of filling defects to indicate stones. The Xray was saved and the catheter and Morrow clamp were removed.    The cystic duct was clipped twice  proximally, once distally and transected with the scissors.  The gallbladder was then removed from the liver using the hook electrocautery.  The gallbladder was passed into an Endocatch bag. We observed the right upper quadrant carefully for hemostasis.  Hemostasis was assured.  The abdomen was irrigated with saline and all clot was removed.   There was drip down clot under the subxiphoid port. With the port removed there was no further bleeding.  Each of the remaining  trocars was removed under direct visualization.  There was no bleeding from any of these sites.  The Saritha trocar was removed and the abdomen was evacuated of CO2. The gallbladder was removed through the umbilical trocar site after opening the gallbladder and extracting a large stone. Additional 0 Vicryl was placed in the fascia between the previously placed 0 vicryls and all were tied down to good effect. The skin of the umbilical incision was anesthetized with local anesthetic.  All of the incisions were closed with interrupted 4-0 Vicryl subcuticular sutures and Steri-Strips.  The patient tolerated the procedure well.  Sponge and instrument counts were correct.      FINDINGS: Large stone in the gallbladder without inflammation. Normal cholangiogram. Bleeding from avulsed cystic artery stump was controlled with a clip.    Kinga Lunsford MD

## 2020-12-16 NOTE — ED NOTES
[facility]  ED Nurse Handoff Report    Kathy Gaspar is a 50 year old female   ED Chief complaint: Flank Pain  . ED Diagnosis:   Final diagnoses:   Acute cholecystitis     Allergies: No Known Allergies    Code Status: Full Code  Activity level - Baseline/Home:  Independent. Activity Level - Current:   Stand by Assist. Lift room needed: No. Bariatric: No   Needed: Yes, Swedish  Isolation: No. Infection: Not Applicable.     Vital Signs:   Vitals:    12/16/20 0155 12/16/20 0200 12/16/20 0205 12/16/20 0225   BP:  127/74     Pulse:  82     Resp:       Temp:       SpO2: 99% 99% 99% 96%       Cardiac Rhythm:  ,      Pain level: 0-10 Pain Scale: 10  Patient confused: No. Patient Falls Risk: Yes.   Elimination Status: Has voided   Patient Report - Initial Complaint: flank pain. Focused Assessment:   02:24 Gastrointestinal Gastrointestinal - Gastrointestinal WDL: .WDL except; nausea and vomiting (pt c/o sudden onset of RUQ pain at 1700 tonight. pt rates the pain 10/10. unable to get comfortable. ) Nausea/Vomiting Signs/Symptoms: nausea continuous; dry-heaving  Nausea/Vomiting Interventions: antiemetic  All Quadrants Abdominal Palpation: tender (RUQ)  Nausea/Vomiting - Nausea/Vomiting Outcome: no relief of signs/symptoms        Tests Performed: blood work, ekg, CT, US. Abnormal Results:   Labs Ordered and Resulted from Time of ED Arrival Up to the Time of Departure from the ED   CBC WITH PLATELETS DIFFERENTIAL - Abnormal; Notable for the following components:       Result Value    RBC Count 5.30 (*)     MCV 75 (*)     MCH 23.0 (*)     MCHC 30.8 (*)     RDW 18.3 (*)     Platelet Count 458 (*)     All other components within normal limits   COMPREHENSIVE METABOLIC PANEL - Abnormal; Notable for the following components:    Glucose 120 (*)     Creatinine 0.39 (*)     Bilirubin Total 1.4 (*)     Alkaline Phosphatase 280 (*)      (*)     AST 1,006 (*)     All other components within normal limits    ROUTINE UA WITH MICROSCOPIC - Abnormal; Notable for the following components:    Ketones Urine 20 (*)     pH Urine 8.0 (*)     Protein Albumin Urine 30 (*)     Amorphous Crystals Moderate (*)     All other components within normal limits   LACTIC ACID WHOLE BLOOD   LIPASE   TROPONIN I   COVID-19 VIRUS (CORONAVIRUS) BY PCR   PERIPHERAL IV CATHETER     US Abdomen Limited (RUQ)   Final Result   IMPRESSION:   1.  Cholelithiasis with gallbladder wall thickening. Difficult to assess sonographic Interiano's sign due to pain medication. Findings could be seen with acute cholecystitis in the appropriate setting. Clinical correlation.      2.  Common bile duct upper limits of normal.      3.  Liver and right kidney are negative. Pancreas is obscured by overlying bowel gas.         CT Abdomen Pelvis w Contrast   Final Result   IMPRESSION:    1.  Right hydronephrosis has resolved.   2.  Esophageal hiatal hernia.   3.  Mild fatty infiltration of the liver.   4.  Heterogeneous uterus possibly representing small fibroids. This could be further assessed with ultrasound.   5.  No findings to account for the patient's symptoms.        .   Treatments provided: fluids, pain medications, nausea medications, antibiotics   Family Comments:  Bony updated. Cell phone: [phone]  OBS brochure/video discussed/provided to patient:  Yes  ED Medications:   Medications   0.9% sodium chloride BOLUS (0 mLs Intravenous Stopped 12/16/20 0218)     Followed by   sodium chloride 0.9% infusion (has no administration in time range)   ondansetron (ZOFRAN) injection 4 mg (4 mg Intravenous Given 12/16/20 0220)   HYDROmorphone (PF) (DILAUDID) injection 0.5 mg (0.5 mg Intravenous Given 12/16/20 0220)   piperacillin-tazobactam (ZOSYN) infusion 3.375 g (has no administration in time range)   ondansetron (ZOFRAN) injection 4 mg (has no administration in time range)   HYDROmorphone (PF) (DILAUDID) injection 0.5 mg (0.5 mg Intravenous Given 12/16/20 0009)    ketorolac (TORADOL) injection 30 mg (30 mg Intravenous Given 12/16/20 0009)   iopamidol (ISOVUE-370) solution 500 mL (100 mLs Intravenous Given 12/16/20 0018)   for CT scan flush use (65 mLs Intravenous Given 12/16/20 0018)     Drips infusing:  Yes  For the majority of the shift, the patient's behavior Green. Interventions performed were frequent rounding.    Sepsis treatment initiated: No     Patient tested for COVID 19 prior to admission: YES    RECEIVING UNIT ED HANDOFF REVIEW    Above ED Nurse Handoff Report was reviewed: Yes  Reviewed by: Suzi Saini RN on December 16, 2020 at 2:36 AM     ED Nurse Name/Phone Number: Jasmine Hernandez RN,   2:25 AM

## 2020-12-16 NOTE — DISCHARGE INSTRUCTIONS
GENERAL ANESTHESIA OR SEDATION ADULT DISCHARGE INSTRUCTIONS   SPECIAL PRECAUTIONS FOR 24 HOURS AFTER SURGERY    IT IS NOT UNUSUAL TO FEEL LIGHT-HEADED OR FAINT, UP TO 24 HOURS AFTER SURGERY OR WHILE TAKING PAIN MEDICATION.  IF YOU HAVE THESE SYMPTOMS; SIT FOR A FEW MINUTES BEFORE STANDING AND HAVE SOMEONE ASSIST YOU WHEN YOU GET UP TO WALK OR USE THE BATHROOM.    YOU SHOULD REST AND RELAX FOR THE NEXT 24 HOURS AND YOU MUST MAKE ARRANGEMENTS TO HAVE SOMEONE STAY WITH YOU FOR AT LEAST 24 HOURS AFTER YOUR DISCHARGE.  AVOID HAZARDOUS AND STRENUOUS ACTIVITIES.  DO NOT MAKE IMPORTANT DECISIONS FOR 24 HOURS.    DO NOT DRIVE ANY VEHICLE OR OPERATE MECHANICAL EQUIPMENT FOR 24 HOURS FOLLOWING THE END OF YOUR SURGERY.  EVEN THOUGH YOU MAY FEEL NORMAL, YOUR REACTIONS MAY BE AFFECTED BY THE MEDICATION YOU HAVE RECEIVED.    DO NOT DRINK ALCOHOLIC BEVERAGES FOR 24 HOURS FOLLOWING YOUR SURGERY.    DRINK CLEAR LIQUIDS (APPLE JUICE, GINGER ALE, 7-UP, BROTH, ETC.).  PROGRESS TO YOUR REGULAR DIET AS YOU FEEL ABLE.    YOU MAY HAVE A DRY MOUTH, A SORE THROAT, MUSCLES ACHES OR TROUBLE SLEEPING.  THESE SHOULD GO AWAY AFTER 24 HOURS.    CALL YOUR DOCTOR FOR ANY OF THE FOLLOWING:  SIGNS OF INFECTION (FEVER, GROWING TENDERNESS AT THE SURGERY SITE, A LARGE AMOUNT OF DRAINAGE OR BLEEDING, SEVERE PAIN, FOUL-SMELLING DRAINAGE, REDNESS OR SWELLING.    IT HAS BEEN OVER 8 TO 10 HOURS SINCE SURGERY AND YOU ARE STILL NOT ABLE TO URINATE (PASS WATER).     HOME CARE FOLLOWING LAPAROSCOPIC CHOLECYSTECTOMY  CHERI Calle, PAUL Knig. MONTEZ Pearl &  SHAMEKA Lunsford      IINCISIONAL CARE:  Replace the bandage over your incisions until all drainage stops, or if more comfortable to have in place.  If present, leave the steri-strips (white paper tapes) in place for 14 days after surgery.  If Dermabond (a type of skin glue) is present, leave in place until it wears/flakes off.   BATHING:  Avoid baths for 1 week after surgery.  Showers are  okay.  You may wash your hair at any time.  Gently pat your incisions dry after bathing.  ACTIVITY:  Light Activity -- you may immediately be up and about as tolerated.  Driving -- you may drive when comfortable and off narcotic pain medications.  Light Work -- resume when comfortable off pain medications.  (If you can drive, you probably can work.)  Strenuous Work/Activity -- limit lifting to 20 pounds for 2 weeks.  Progressively increase with time.  Active Sports (running, biking, etc.) -- cautiously resume after 2 weeks.  DISCOMFORT:  Use pain medications as prescribed by your surgeon.  Take the pain medication with some food, when possible, to minimize side effects.  Intermittent use of ice packs at the incision sites may help during the first 48 hours.  Expect gradual improvement.  You may experience shoulder pain, which is due to the air placed within your abdomen during the procedure.  This is temporary and usually passes within 2 days.  DIET:  Drink plenty of fluids.  While taking pain medications, increase dietary fiber or add a fiber supplementation like Metamucil or Citrucel to help prevent constipation - a possible side effect of pain medications.  If taking Metamucil or Citrucel, take with plenty of fluids as instructed.  It is not uncommon to experience some bowel changes (loose stools or constipation) after surgery.  Your body has to adapt to you no longer having a gall bladder.  To help minimize this side effect, avoid fatty foods for the first week after surgery.  You may then slowly increase the amount of fatty foods in your diet.    NAUSEA:  If nauseated from the anesthetic/pain meds; rest in bed, get up cautiously with assistance, and drink clear liquids (juice, tea, broth).  FOLLOW-UP AFTER SURGERY:  Our office will contact you approximately 2-3 weeks after surgery to check on your progress and answer any questions you may have.  If you are doing well, you will not need to return for an office  appointment.  If any concerns are identified over the phone, we will help you make an appointment to see a provider.  If you have not received a phone call, have any questions or concerns, or would like to be seen, please call us at [phone].  We are located at [address], [facility] [address], [place] [address].    CONTACT US IF THE FOLLOWING DEVELOPS:  1.  A fever that is above 101   2.  If there is a large amount of drainage, bleeding, or swelling.  3.  Severe pain that is not relieved by your prescription.  4.  Drainage that is thick, cloudy, yellow, green or white.                                                             5.  Any other questions not answered by  Frequently Asked Questions  sheet.     You received Toradol, an IV form of ibuprofen (Motrin) at 1540.  Do not take any ibuprofen products until 9:40 pm.

## 2020-12-16 NOTE — CONSULTS
GASTROENTEROLOGY CONSULTATION      Kathy Gaspar  [id number] [facility] [address]  50 year old female     Admission Date/Time: 12/15/2020  Primary Care Provider: [facility]  Referring / Attending Physician:  Dr. Velasquez     We were asked to see the patient in consultation by Dr. Velasquez for evaluation of elevated LFTs.        HPI:  Kathy Gaspar is a 50 year old female without significant past medical history who presented to the emergency room with sudden onset of right upper quadrant right back pain.    Patient reports she had sudden onset of severe pain in her right upper quadrant radiating to her right mid back.  The pain was so severe that she became nauseated and threw up on 2 occasions.  The pain did cause her to have some chills but she denies any history of fever.  The pain was worse when she tried to eat or drink so she decided to come to the emergency room.  She denies any known history of gallbladder dysfunction.  No previous abdominal surgeries.  She has no known history of liver disease.    In the emergency room LFTs, AST 1000, , alkaline phosphatase 280, total bilirubin 1.4.  Ultrasound revealed stones in the gallbladder.  The common bile duct measures 6 mm.  This morning transaminases are improving.  , , alkaline phosphatase 260, total bilirubin 1.5.  Additionally this morning patient does not have any abdominal pain.  She has not been requiring any pain medications.  No white count.       PAST MEDICAL HISTORY:  Patient Active Problem List    Diagnosis Date Noted     Acute cholecystitis 12/16/2020     Priority: Medium          ROS: A comprehensive ten point review of systems was negative aside from those in mentioned in the HPI.       MEDICATIONS:   Prior to Admission medications    Not on File        ALLERGIES: No Known Allergies     SOCIAL HISTORY:  Social History     Tobacco Use     Smoking status: Never Smoker     Smokeless tobacco:  "Never Used   Substance Use Topics     Alcohol use: Not Currently     Drug use: Never        FAMILY HISTORY:  History reviewed. No pertinent family history.     PHYSICAL EXAM:     /55 (BP Location: Right arm)   Pulse 64   Temp 98.1  F (36.7  C) (Temporal)   Resp 18   Ht 1.346 m (4' 5\")   Wt 88.3 kg (194 lb 11.2 oz)   SpO2 98%   BMI 48.73 kg/m       PHYSICAL EXAM:  GENERAL:  NAD  SKIN: no suspicious lesions, rashes, jaundice  HEAD: Normocephalic. Atraumatic.  NECK: Neck supple. No adenopathy.   EYES: No scleral icterus  RESPIRATORY: Good transmission. CTA bilaterally.   CARDIOVASCULAR: RRR, normal S1, S2,  No murmur appreciated  GASTROINTESTINAL: +BS, soft, non tender, non distended, no guarding/rebound  JOINT/EXTREMITIES:  no gross deformities noted, normal muscle tone  NEURO: CN 2-12 grossly intact, no focal deficits  PSYCH: Normal affect          ADDITIONAL COMMENTS:   I reviewed the patient's new clinical lab test results.   Recent Labs   Lab Test 12/15/20  2343 09/08/20  0623   WBC 7.9 11.8*   HGB 12.2 10.7*   MCV 75* 72*   * 434     Recent Labs   Lab Test 12/15/20  2343 09/08/20  0623   POTASSIUM 4.2 3.3*   CHLORIDE 108 107   CO2 25 25   BUN 9 8   ANIONGAP 4 6     Recent Labs   Lab Test 12/16/20  0758 12/16/20  0129 12/15/20  2343 09/08/20  0648 09/08/20  0623   ALBUMIN 3.3*  --  3.9  --  4.1   BILITOTAL 1.5*  --  1.4*  --  0.3   *  --  574*  --  24   *  --  1,006*  --  24   PROTEIN  --  30*  --  Negative  --    LIPASE  --   --  144  --   --         IMAGING / ENDOSCOPY     EXAM: US ABDOMEN LIMITED  LOCATION: [facility]  DATE/TIME: 12/16/2020 1:04 AM     FINDINGS:  GALLBLADDER: Numerous gallstones fill a majority of the gallbladder. Gallbladder wall is thickened at 5 mm. Sonographic Interiano's sign difficult to assess due to pain medications.  BILE DUCTS: No biliary dilatation. The common duct measures 6 mm.  LIVER: Normal parenchyma with smooth contour. No focal " mass.  RIGHT KIDNEY: No hydronephrosis.  PANCREAS: Pancreas is obscured by overlying bowel gas.  No ascites.    IMPRESSION:  1.  Cholelithiasis with gallbladder wall thickening. Difficult to assess sonographic Interiano's sign due to pain medication. Findings could be seen with acute cholecystitis in the appropriate setting. Clinical correlation.     2.  Common bile duct upper limits of normal.     3.  Liver and right kidney are negative. Pancreas is obscured by overlying bowel gas.     CONSULTATION ASSESSMENT AND PLAN:    Kathy Gaspar is a 50 year old without significant past medical history who presented to the emergency room with sudden onset of right upper quadrant right back pain found to have elevated LFTs and cholelithiasis with possible cholecystitis.    1. Cholelithiasis with elevated LFTs: Suspect patient passed a stone.  Ultrasound with evidence of cholelithiasis.  Common bile duct is measuring at the upper limits of normal.  She currently has no abdominal pain and transaminases are improving. Given the fairly dramatic improvement and normal-appearing liver on ultrasound, suspect stone being the culprit.     --Would recommend cholecystectomy for management.  General surgery is following.  --Could consider MRCP if surgery would like to evaluate the ducts further.      I discussed the patient plan with Dr. Sifuentes.  Thank you for asking us to participate in the care of this patient.    Isa Angela PA-C  [facility] ( [facility])

## 2020-12-16 NOTE — H&P
[facility]    Hospitalist History and Physical    Name: Kathy Gaspar    MRN: [id number]  YOB: 1970    Age: 50 year old  Date of Admission:  12/15/2020  Date of Service (when I saw the patient): 12/16/20    Assessment & Plan   Kathy Gaspar is a 50 year old female with no significant comorbidities presented to the emergency room with sudden onset right upper quadrant and upper back pain, nausea and vomiting.  Evaluation in the emergency room showed significantly elevated LFTs AST> ALT.  Right upper quadrant ultrasound showed evidence of cholecystitis.  Patient denies any use of alcohol, no new drugs, no recent URIs.    Acute cholecystitis  Abnormal LFTs with obstructive pattern:  AST >ALT; elevated alk phos and total bili  -Right upper quadrant ultrasound showed cholelithiasis and acute cholecystitis  -Right upper quadrant tenderness on exam  -N.p.o.  -IV fluids  -Started on IV Zosyn in the emergency room will continue  -As needed pain meds  -General surgery consult in a.m.  -Gastroenterology consult    Abnormal LFTs  -AST 1006,   -Elevated alk phos and bili  -Recheck  -?  Obstructing gallstone  -GI consult  -Patient denies any use of alcohol, no new drugs  -Took some ibuprofen and Tums for pain          DVT Prophylaxis: Pneumatic Compression Devices  Code Status: Full Code    Disposition: Admitted as inpatient    Primary Care Physician   [facility]    Chief Complaint   Right upper quadrant and right upper back pain 1 day    History is obtained from the patient    History of Present Illness   Kathy Gaspar is a 50 year old female with no significant comorbidities presented to the emergency room with sudden onset right upper quadrant and right upper back pain.  Pain severe constant 10 out of 10 in intensity radiating from back to upper quadrant associated with nausea vomited x2 vomitus was mainly liquid and gastric contents.  Had  significant chills with pain no documented fever.  Pain worse on drinking could not keep anything down so came to the emergency room.  Had normal BM with no change in color of stool or urine.  Complains of generalized malaise weakness feels tired and weak.  No fever or chills.  No COVID-19 exposures.  Review of all the other symptoms are negative.  No chest pain pressure heaviness or tightness.  No shortness of breath.  No cough no fever.  No dysuria.  No hematuria.  Does have history of kidney stones    Evaluation in the emergency room concerning for acute cholecystitis patient received as needed pain meds and admitted for further evaluation treatment    Past Medical History    No past medical history on file.      Past Surgical History   No past surgical history on file.    Prior to Admission Medications   Prior to Admission Medications   Prescriptions Last Dose Informant Patient Reported? Taking?   ibuprofen (ADVIL/MOTRIN) 600 MG tablet   No No   Sig: Take 1 tablet (600 mg) by mouth every 6 hours as needed for moderate pain      Facility-Administered Medications: None     Allergies   No Known Allergies    Social History   Social History     Tobacco Use     Smoking status: Never Smoker     Smokeless tobacco: Never Used   Substance Use Topics     Alcohol use: Not Currently     Social History     Social History Narrative     Not on file     Lives with her .  Denies any smoking denies any use of alcohol    Family History   Family history reviewed with patient and is noncontributory.  Patient's mother had diabetes mellitus    Review of Systems   A Comprehensive greater than 10 system review of systems was carried out.  Pertinent positives and negatives are noted above.  Otherwise negative for contributory information.    Physical Exam   Temp: 98.3  F (36.8  C)   BP: 127/74 Pulse: 82   Resp: 20 SpO2: 99 %      Vital Signs with Ranges  Temp:  [98.3  F (36.8  C)] 98.3  F (36.8  C)  Pulse:  [80-88] 82  Resp:  [20]  20  BP: (127-138)/(57-98) 127/74  SpO2:  [95 %-100 %] 99 %  0 lbs 0 oz    GEN:  Alert, oriented x 3, appears comfortable, no overt distress  HEENT:  Normocephalic/atraumatic, no scleral icterus, no nasal discharge, mouth moist.  CV:  Regular rate and rhythm, no murmur or JVD.  S1 + S2 noted, no S3 or S4.  LUNGS:  Clear to auscultation bilaterally without rales/rhonchi/wheezing/retractions.  Symmetric chest rise on inhalation noted.  ABD:  Active bowel sounds, right upper quadrant tenderness.  No rebound/guarding/rigidity.  EXT:  No edema.  No cyanosis.  No joint synovitis noted.  SKIN:  Dry to touch, no exanthems noted in the visualized areas.  NEURO:  Symmetric muscle strength,   No new focal deficits appreciated.    Data   Data reviewed today:  I personally reviewed the Right upper quadrant ultrasound image(s) showing Gallstones.    No results for input(s): PH, PHV, PO2, PO2V, SAT, PCO2, PCO2V, HCO3, HCO3V in the last 168 hours.  Recent Labs   Lab 12/15/20  2343   WBC 7.9   HGB 12.2   HCT 39.6   MCV 75*   *     Recent Labs   Lab 12/15/20  2343      POTASSIUM 4.2   CHLORIDE 108   CO2 25   ANIONGAP 4   *   BUN 9   CR 0.39*   GFRESTIMATED >90   GFRESTBLACK >90   FAUSTO 9.1     No results for input(s): CULT in the last 168 hours.  No results for input(s): NTBNPI, NTBNP in the last 168 hours.  Recent Labs   Lab 12/15/20  2343   HGB 12.2     Recent Labs   Lab 12/15/20  2343   AST 1,006*   *   ALKPHOS 280*   BILITOTAL 1.4*     No results for input(s): INR in the last 168 hours.  Recent Labs   Lab 12/15/20  2343   LACT 1.4     Recent Labs   Lab 12/15/20  2343   LIPASE 144     Recent Labs   Lab 12/15/20  2343   TROPI <0.015     Recent Labs   Lab 12/16/20  0129   COLOR Yellow   APPEARANCE Slightly Cloudy   URINEGLC Negative   URINEBILI Negative   URINEKETONE 20*   SG 1.013   UBLD Negative   URINEPH 8.0*   PROTEIN 30*   NITRITE Negative   LEUKEST Negative   RBCU 0   WBCU 0       Recent Results (from  the past 24 hour(s))   CT Abdomen Pelvis w Contrast    Narrative    EXAM: CT ABDOMEN PELVIS W CONTRAST  LOCATION: [facility]  DATE/TIME: 12/16/2020 12:17 AM    INDICATION: Nausea, vomiting, Abd pain, acute, generalized  COMPARISON: CT abdomen pelvis 09/08/2020  TECHNIQUE: CT scan of the abdomen and pelvis was performed following injection of IV contrast. Multiplanar reformats were obtained. Dose reduction techniques were used.  CONTRAST: 100mL Isovue-370    FINDINGS:   LOWER CHEST: Small esophageal hiatal hernia.    HEPATOBILIARY: Mild fatty infiltration of the liver.    PANCREAS: Normal.    SPLEEN: Normal.    ADRENAL GLANDS: Normal.    KIDNEYS/BLADDER: The kidneys are normal. Right hydronephrosis has resolved. 2 mm bladder stone is no longer present.    BOWEL: Normal. No evidence for bowel obstruction.    LYMPH NODES: A few prominent right lower quadrant mesenteric lymph nodes are unchanged.    VASCULATURE: Unremarkable.    PELVIC ORGANS: Heterogeneous uterus with a few small hypodense lesions possibly representing fibroids.    MUSCULOSKELETAL: Normal.      Impression    IMPRESSION:   1.  Right hydronephrosis has resolved.  2.  Esophageal hiatal hernia.  3.  Mild fatty infiltration of the liver.  4.  Heterogeneous uterus possibly representing small fibroids. This could be further assessed with ultrasound.  5.  No findings to account for the patient's symptoms.   US Abdomen Limited (RUQ)    Narrative    EXAM: US ABDOMEN LIMITED  LOCATION: [facility]  DATE/TIME: 12/16/2020 1:04 AM    INDICATION: Abdominal pain.    COMPARISON: CT scan from 12/16/2020.    TECHNIQUE: Limited abdominal ultrasound.    FINDINGS:  GALLBLADDER: Numerous gallstones fill a majority of the gallbladder. Gallbladder wall is thickened at 5 mm. Sonographic Interiano's sign difficult to assess due to pain medications.    BILE DUCTS: No biliary dilatation. The common duct measures 6 mm.    LIVER: Normal parenchyma with smooth  contour. No focal mass.    RIGHT KIDNEY: No hydronephrosis.    PANCREAS: Pancreas is obscured by overlying bowel gas.    No ascites.      Impression    IMPRESSION:  1.  Cholelithiasis with gallbladder wall thickening. Difficult to assess sonographic Interiano's sign due to pain medication. Findings could be seen with acute cholecystitis in the appropriate setting. Clinical correlation.    2.  Common bile duct upper limits of normal.    3.  Liver and right kidney are negative. Pancreas is obscured by overlying bowel gas.

## 2020-12-16 NOTE — PROVIDER NOTIFICATION
REASON FOR CONTACT: Pt still very nauseated, 50cc emesis. Too early for Zofran. Could she get some Compazine?  PROVIDER CONTACTED: admitting hospitalist   TIME CONTACTED: now   MODE OF CONTACT: wbt   RESPONSE: orders received

## 2020-12-16 NOTE — ED NOTES
DATE:  12/16/2020   TIME OF RECEIPT FROM LAB:  0026  LAB TEST:    AST 1006    RESULTS GIVEN WITH READ-BACK TO (PROVIDER): desouza   TIME LAB VALUE REPORTED TO PROVIDER:   0027

## 2020-12-16 NOTE — ED PROVIDER NOTES
History   Chief Complaint:  Flank Pain       HPI   Kathy Gaspar is a 50 year old female with history of anemia who presents with gradually worsening back pain with radiation to the front that started about 6 hours ago. She also notes several episodes of vomiting. She has never had this pain before. The patient mentions that she was here a couple months ago for renal colic. She describes the pain as mainly in the right upper abdomen with radiation into the back. She rates the pain as a 10/10 at its worst.     Review of Systems   Gastrointestinal: Positive for vomiting.   Genitourinary: Positive for flank pain.   Musculoskeletal: Positive for back pain.   All other systems reviewed and are negative.      Allergies:  The patient has no known allergies.     Medications:  The patient is not currently taking any prescribed medications.    Past Medical History:    Screening for malignant neoplasm of cervix  Iron deficiency anemia     Past Surgical History:    Appendectomy    Family History:    Mother: Type 2 diabetes    Social History:  Presents to the ED alone.  Marital status:      Physical Exam     Patient Vitals for the past 24 hrs:   BP Temp Pulse Resp SpO2   12/16/20 0200 127/74 -- 82 -- 99 %   12/16/20 0145 138/73 -- -- -- --   12/16/20 0100 -- -- -- -- 97 %   12/16/20 0045 -- -- -- -- 95 %   12/16/20 0030 127/57 -- 88 -- 98 %   12/16/20 0015 -- -- -- -- 100 %   12/15/20 2325 (!) 129/98 98.3  F (36.8  C) 80 20 100 %       Physical Exam  Constitutional: Alert,   HENT:    Nose: Nose normal.    Mouth/Throat: Oropharynx is clear, mucous membranes are moist  Eyes: EOM are normal. Pupils are equal, round, and reactive to light.   CV: Regular rate and rhythm, no murmurs, rubs or gallops.  Resp: Clear lungs to auscultation, all lung fields. Normal respiratory effort.   GI: Soft, slightly distended. Diffuse abdominal tenderness. No rebound or guarding.   MSK: Normal range of motion. No deformity.    Neurological:   A/Ox3  5/5 strength is symmetric to the upper and lower extremities;   Sensation intact to light touch throughout the upper and lower extremities;   Skin: Skin is warm and dry.    Emergency Department Course     ECG:  ECG taken at 2343, ECG read at 2350  Normal sinus rhythm.   No ST elevation or depression   No T wave inversion   No evidence of HOCM, ARVD, Brugada, WPW, AV block, prolonged QT.  Rate 80 bpm. NJ interval 128 ms. QRS duration 88 ms. QT/QTc 394/454 ms. P-R-T axes 31 47 25.     ECG:  ECG taken at 0030, ECG read at 0034  Normal sinus rhythm. Prolonged QT. T wave inversion in lead III  No ST elevation or depression   No T wave inversion   No evidence of HOCM, ARVD, Brugada, WPW, AV block, prolonged QT.  Rate 89 bpm. NJ interval 152 ms. QRS duration 94 ms. QT/QTc 406/493 ms. P-R-T axes 70 40 17.       Imaging:  CT Abdomen/Pelvis with IV contrast:   1.  Right hydronephrosis has resolved.  2.  Esophageal hiatal hernia.  3.  Mild fatty infiltration of the liver.  4.  Heterogeneous uterus possibly representing small fibroids. This could be further assessed with ultrasound.  5.  No findings to account for the patient's symptoms. As per radiology.    US Abdomen Limited (RUQ):  1.  Cholelithiasis with gallbladder wall thickening. Difficult to assess sonographic Interiano's sign due to pain medication. Findings could be seen with acute cholecystitis in the appropriate setting. Clinical correlation.     2.  Common bile duct upper limits of normal.     3.  Liver and right kidney are negative. Pancreas is obscured by overlying bowel gas.     Laboratory:  CBC: WBC: 7.9, HGB: 12.2, PLT: 458 (H)    CMP: Glucose 120 (H), Creatinine: 0.39 (L) Bilirubin Total: 1.4 (H), Alkaline Phosphatase: 280 (H), ALT: 574 (H), AST: 1,006 (H), o/w WNL     0000 Lactic acid: 1.4    0033 Troponin: <0.015    Lipase: 144    UA: Ketones: 20 (A), pH: 8.0 (H), Protein Albumin: 30 (A) , Amorphous Crystals: Moderate (A) , o/w  Negative    Asymptomatic COVID-19 PCR: Pending       Emergency Department Course:    Reviewed:    I reviewed the patient's nursing notes, vitals, past medical records, Care Everywhere.     Assessments:  210 Updated patient with plan for admission.     Consults:   219 Consulted with Dr. Velasquez, Hospitalist, for admission.     Interventions:  0009 NS 1L IV  0009 Zofran 4 mg IV  0009 Dilaudid 0.5 mg IV  0009 Toradol 30 mg IV  0220 Zofran 4 mg IV  0220 Dilaudid 0.5 mg IV  0239 Zosyn 3.375 g IV    Disposition:  The patient was admitted to the hospital under the care of Dr. Velasquez.     Impression & Plan     Covid-19  Kathy Gaspar was evaluated during a global COVID-19 pandemic, which necessitated consideration that the patient might be at risk for infection with the SARS-CoV-2 virus that causes COVID-19.   Applicable protocols for evaluation were followed during the patient's care.   COVID-19 was considered as part of the patient's evaluation. The plan for testing is:  a test was obtained during this visit.    Medical Decision Makin year old female who comes in with abdominal pain radiating into her back. Exam as above. Labs and CT scan obtained. Labs show elevated Tbili and significant elevation in transaminases. White count elevated. CT without clear explanation. US RUQ shows cholecystitis. Zosyn started in the department. Admitted to Dr. Velasquez.        Diagnosis:    ICD-10-CM    1. Acute cholecystitis  K81.0        Scribe Disclosure:  I, Nato Amaro, am serving as a scribe at 11:35 PM on 12/15/2020 to document services personally performed by Juni Young DO based on my observations and the provider's statements to me.            Juni Young DO  [id number]

## 2020-12-16 NOTE — CONSULTS
General Surgery Consultation    Kathy Gaspar MRN# [id number]   Age: 50 year old YOB: 1970     Date of Admission:  12/15/2020    Reason for consult:            Cholecystitis       Requesting physician:            Brandi Velasquez MD                Assessment and Plan:   Assessment:   Kathy Gaspar is a 50 year old healthy female with 1 day of abdominal pain/R back pain. US shows gallstones, possible cholecystitis (gallbladder wall 5mm), and elevated LFTs - bili 1.5 (direct 0.8), alk phos 264, alt 637, . CBD 6mm on ultrasound. LFTs concerning for hepatitis (slightly high to be just secondary to cholecystitis, though could be), and also possible choledocholithiasis.        Plan:   Gastroenterology consult to weigh in on causes for hepatitis as AST/ALT are higher than typically seen with cholecystitis and possible need for further bile duct imaging due to obstructive pattern of LFTs  Will consider lap milagros today or as outpatient, pts symptoms are improved currently.     Addendum  Gastroenterology evaluation - no plan for ERCP or concern for other causes of hepatitis or choledocholithiasis given improvement in LFTs and symptoms today.    I have offered the patient a laparoscopic cholecystectomy with intraoperative cholangiogram, today.  We have discussed if there is choledocholithiasis found during surgery, she would need to stay inpatient and have ERCP tomorrow.    We have discussed the indication, alternatives, risks and expected recovery.  Specifically we have discussed incisions, scarring, postoperative infections, anesthesia, bleeding, blood transfusion, open conversion, common bile duct injury, injury to intra-abdominal organs, adhesions leading to bowel obstruction, retained common bile duct stone, bile leak, DVT, PE, hernia, post cholecystectomy diarrhea, recovery, postoperative dietary restrictions and physical limitations.  We have discussed the recommended interventions and  treatments for these complications.  All questions have been answered to the best of my ability.    She elects to proceed with surgery.   Will plan to discharge to home after surgery today unless complications arise or there is evidence of choledoholithiasis on cholangiogram.           Chief Complaint:   Abdominal pain, right upper quadrant  Back pain     History is obtained from the patient.         History of Present Illness:   Kathy Gaspar is a 50 year old female who presents with right back/flank and RUQ abdominal pain for the past 1 day.  Pt states it started yesterday afternoon. She had a normal breakfast, no lunch. She started vomiting as well. No diarrhea, constipation, urinary symptoms, denies dark urine.  Was in the ED a couple months ago with R flank pain and diagnosed with nephrolithiasis. Other than that episode denies similar episodes of pain.   She denies taking tylenol, denies any other sick contacts or other systemic illness recently  This morning pain is resolved. She was still nauseated overnight and had compazine this morning with relief.         Past Medical History:   nephrolithiasis          Past Surgical History:   Open appy teen          Social History:     Social History     Tobacco Use     Smoking status: Never Smoker     Smokeless tobacco: Never Used   Substance Use Topics     Alcohol use: Not Currently   Denies any ETOH          Family History:   History reviewed. No pertinent family history.           Allergies:   No Known Allergies          Medications:   No current facility-administered medications on file prior to encounter.        ibuprofen (ADVIL/MOTRIN) 600 MG tablet, Take 1 tablet (600 mg) by mouth every 6 hours as needed for moderate pain        piperacillin-tazobactam  3.375 g Intravenous Q8H     sodium chloride (PF)  3 mL Intracatheter Q8H            Review of Systems:   The 10 point review of systems is negative other than noted in the HPI.          Physical Exam:   BP  "123/55 (BP Location: Right arm)   Pulse 64   Temp 98.1  F (36.7  C) (Temporal)   Resp 18   Ht 1.346 m (4' 5\")   Wt 88.3 kg (194 lb 11.2 oz)   SpO2 98%   BMI 48.73 kg/m    General - Well developed, well nourished female in no apparent distress  HEENT:  Head normocephalic and atraumatic, pupils equal and round, conjunctivae clear, no scleral icterus  Neck: Supple without thyromegaly or masses  Lymphatic: No cervical, or supraclavicular lymphadenopathy  Lungs Breathing comfortably  Heart: regular rate on radial pulse check  Abdomen: soft, obese, non-distended with no tenderness noted diffusely. no masses palpated  Extremities: Warm without edema  Neurologic: nonfocal  Psychiatric: Mood and affect appropriate  Skin: Without lesions, rashes, or jaundice         Data:     WBC -   Lab Results   Component Value Date    WBC 7.9 12/15/2020       HgB -   Lab Results   Component Value Date    HGB 12.2 12/15/2020       Plt-   Lab Results   Component Value Date     (H) 12/15/2020       Liver Function Studies -   Recent Labs   Lab Test 12/15/20  2343   PROTTOTAL 8.5   ALBUMIN 3.9   BILITOTAL 1.4*   ALKPHOS 280*   AST 1,006*   *       Lipase-   Lab Results   Component Value Date    LIPASE 144 12/15/2020         Imaging:  All imaging studies reviewed by me.    Results for orders placed or performed during the hospital encounter of 12/15/20   CT Abdomen Pelvis w Contrast    Narrative    EXAM: CT ABDOMEN PELVIS W CONTRAST  LOCATION: [facility]  DATE/TIME: 12/16/2020 12:17 AM    INDICATION: Nausea, vomiting, Abd pain, acute, generalized  COMPARISON: CT abdomen pelvis 09/08/2020  TECHNIQUE: CT scan of the abdomen and pelvis was performed following injection of IV contrast. Multiplanar reformats were obtained. Dose reduction techniques were used.  CONTRAST: 100mL Isovue-370    FINDINGS:   LOWER CHEST: Small esophageal hiatal hernia.    HEPATOBILIARY: Mild fatty infiltration of the liver.    PANCREAS: " Normal.    SPLEEN: Normal.    ADRENAL GLANDS: Normal.    KIDNEYS/BLADDER: The kidneys are normal. Right hydronephrosis has resolved. 2 mm bladder stone is no longer present.    BOWEL: Normal. No evidence for bowel obstruction.    LYMPH NODES: A few prominent right lower quadrant mesenteric lymph nodes are unchanged.    VASCULATURE: Unremarkable.    PELVIC ORGANS: Heterogeneous uterus with a few small hypodense lesions possibly representing fibroids.    MUSCULOSKELETAL: Normal.      Impression    IMPRESSION:   1.  Right hydronephrosis has resolved.  2.  Esophageal hiatal hernia.  3.  Mild fatty infiltration of the liver.  4.  Heterogeneous uterus possibly representing small fibroids. This could be further assessed with ultrasound.  5.  No findings to account for the patient's symptoms.   US Abdomen Limited (RUQ)    Narrative    EXAM: US ABDOMEN LIMITED  LOCATION: [facility]  DATE/TIME: 12/16/2020 1:04 AM    INDICATION: Abdominal pain.    COMPARISON: CT scan from 12/16/2020.    TECHNIQUE: Limited abdominal ultrasound.    FINDINGS:  GALLBLADDER: Numerous gallstones fill a majority of the gallbladder. Gallbladder wall is thickened at 5 mm. Sonographic Interiano's sign difficult to assess due to pain medications.    BILE DUCTS: No biliary dilatation. The common duct measures 6 mm.    LIVER: Normal parenchyma with smooth contour. No focal mass.    RIGHT KIDNEY: No hydronephrosis.    PANCREAS: Pancreas is obscured by overlying bowel gas.    No ascites.      Impression    IMPRESSION:  1.  Cholelithiasis with gallbladder wall thickening. Difficult to assess sonographic Interiano's sign due to pain medication. Findings could be seen with acute cholecystitis in the appropriate setting. Clinical correlation.    2.  Common bile duct upper limits of normal.    3.  Liver and right kidney are negative. Pancreas is obscured by overlying bowel gas.           Time spent with the patient, reviewing the EMR, reviewing laboratory  and imaging studies, more than 50% of which was counseling and coordinating care:  60 minutes.     Kinga Lunsford MD

## 2020-12-16 NOTE — ANESTHESIA PROCEDURE NOTES
Airway   Date/Time: 12/16/2020 1:10 PM   Patient location during procedure: OR    Staff -   Performed By: CRNA    Consent for Airway   Urgency: elective    Indications and Patient Condition  Indications for airway management: israel-procedural  Induction type:intravenousMask difficulty assessment: 1 - vent by mask    Final Airway Details  Final airway type: endotracheal airway  Successful airway:ETT - single  Endotracheal Airway Details   Cuffed: yes  Successful intubation technique: direct laryngoscopy  Grade View of Cords: 1  Adjucts: stylet  Measured from: gums/teeth  Secured at (cm): 22  Secured with: plastic tape  Bite block used: None    Post intubation assessment   Placement verified by: capnometry and equal breath sounds   Number of attempts at approach: 1  Secured with:plastic tape  Ease of procedure: easy  Dentition: Intact

## 2020-12-16 NOTE — PHARMACY-ADMISSION MEDICATION HISTORY
Admission medication history interview status for this patient is complete. See [facility] admission navigator for allergy information, prior to admission medications and immunization status.     Medication history interview done via telephone during Covid-19 pandemic, indicate source(s): Patient  Medication history resources (including written lists, pill bottles, clinic record):None  Pharmacy:     Called patient- reports NO medications prescription or over the counter    Changes made to PTA medication list:  Added:   Deleted: ibuprofen  Changed:     Actions taken by pharmacist (provider contacted, etc):None     Additional medication history information:None    Medication reconciliation/reorder completed by provider prior to medication history?  N   (Y/N)           Prior to Admission medications    Not on File

## 2020-12-16 NOTE — ANESTHESIA PREPROCEDURE EVALUATION
Anesthesia Pre-Procedure Evaluation    Patient: Kathy Gaspar   MRN: [id number] : 1970          Preoperative Diagnosis: Cholecystitis [K81.9]    Procedure(s):  CHOLECYSTECTOMY, LAPAROSCOPIC, WITH CHOLANGIOGRAM    History reviewed. No pertinent past medical history.  History reviewed. No pertinent surgical history.  Anesthesia Evaluation     .             ROS/MED HX    ENT/Pulmonary:  - neg pulmonary ROS    (-) sleep apnea   Neurologic:       Cardiovascular:  - neg cardiovascular ROS       METS/Exercise Tolerance:     Hematologic:         Musculoskeletal:         GI/Hepatic:     (+) cholecystitis/cholelithiasis,      (-) GERD   Renal/Genitourinary:         Endo:         Psychiatric:         Infectious Disease:         Malignancy:         Other:                          Physical Exam      Airway   Mallampati: II  TM distance: <3 FB    Dental     Cardiovascular   Rhythm and rate: regular and normal      Pulmonary    breath sounds clear to auscultation            Lab Results   Component Value Date    WBC 7.9 12/15/2020    HGB 12.2 12/15/2020    HCT 39.6 12/15/2020     (H) 12/15/2020     12/15/2020    POTASSIUM 4.2 12/15/2020    CHLORIDE 108 12/15/2020    CO2 25 12/15/2020    BUN 9 12/15/2020    CR 0.39 (L) 12/15/2020     (H) 12/15/2020    FAUSTO 9.1 12/15/2020    ALBUMIN 3.3 (L) 2020    PROTTOTAL 7.2 2020     (HH) 2020     (HH) 2020    ALKPHOS 264 (H) 2020    BILITOTAL 1.5 (H) 2020    LIPASE 94 2020       Preop Vitals  BP Readings from Last 3 Encounters:   20 123/55   20 (!) 148/89    Pulse Readings from Last 3 Encounters:   20 64   20 80      Resp Readings from Last 3 Encounters:   20 18   20 20    SpO2 Readings from Last 3 Encounters:   20 98%   20 100%      Temp Readings from Last 1 Encounters:   20 98.1  F (36.7  C) (Temporal)    Ht Readings from Last 1 Encounters:   20  "1.346 m (4' 5\")      Wt Readings from Last 1 Encounters:   12/16/20 88.3 kg (194 lb 11.2 oz)    Estimated body mass index is 48.73 kg/m  as calculated from the following:    Height as of this encounter: 1.346 m (4' 5\").    Weight as of this encounter: 88.3 kg (194 lb 11.2 oz).       Anesthesia Plan      History & Physical Review  History and physical reviewed and following examination; no interval change.    ASA Status:  2 .    NPO Status:  > 8 hours    Plan for General with Intravenous and Propofol induction. Maintenance will be Balanced.    PONV prophylaxis:  Ondansetron (or other 5HT-3) and Dexamethasone or Solumedrol         Postoperative Care  Postoperative pain management:  IV analgesics, Oral pain medications and Multi-modal analgesia.      Consents  Anesthetic plan, risks, benefits and alternatives discussed with:  Patient..                 Elijah Harrison MD                    .  "

## 2020-12-16 NOTE — ANESTHESIA CARE TRANSFER NOTE
Patient: Kathy Gaspar    Procedure(s):  CHOLECYSTECTOMY, LAPAROSCOPIC, WITH CHOLANGIOGRAM    Diagnosis: Cholecystitis [K81.9]  Diagnosis Additional Information: No value filed.    Anesthesia Type:   General     Note:    Patient transferred to:PACU  Comments: PT spont resps ETT removed to PACU VSS report to RNHandoff Report: Identifed the Patient, Identified the Reponsible Provider, Reviewed the pertinent medical history, Discussed the surgical course, Reviewed Intra-OP anesthesia mangement and issues during anesthesia, Set expectations for post-procedure period and Allowed opportunity for questions and acknowledgement of understanding      Vitals: (Last set prior to Anesthesia Care Transfer)    CRNA VITALS  12/16/2020 1416 - 12/16/2020 1449      12/16/2020             Pulse:  65    SpO2:  (!) 80 %                Electronically Signed By: SUJATHA Silvestre CRNA  December 16, 2020  2:49 PM

## 2020-12-16 NOTE — PLAN OF CARE
Pt A/O, pt speaks Occitan and english. Denies pain. Up with SBA. Emesis/nausea- Zofran and compazine given. NPO, IV infusing. Covid test negative. GI and surgery consult today

## 2020-12-16 NOTE — ANESTHESIA POSTPROCEDURE EVALUATION
Patient: Kathy Gaspar    Procedure(s):  CHOLECYSTECTOMY, LAPAROSCOPIC, WITH CHOLANGIOGRAM    Diagnosis:Cholecystitis [K81.9]  Diagnosis Additional Information: No value filed.    Anesthesia Type:  General    Note:  Anesthesia Post Evaluation    Patient location during evaluation: PACU  Patient participation: Able to fully participate in evaluation  Level of consciousness: awake  Pain management: adequate  Airway patency: patent  Cardiovascular status: acceptable  Respiratory status: acceptable  Hydration status: acceptable  PONV: controlled     Anesthetic complications: None          Last vitals:  Vitals:    12/16/20 1656 12/16/20 1700 12/16/20 1717   BP:  (!) 158/93 (!) 158/93   Pulse: 94 92 82   Resp: 11 13 16   Temp: 99.3  F (37.4  C)  97.8  F (36.6  C)   SpO2: 99% 97% 98%         Electronically Signed By: Elijah Harrison MD  December 16, 2020  5:41 PM

## 2020-12-16 NOTE — CONSULTS
Care Transitions  CM acknowledges consult for financial/insurance concerns. Bedside nurse states pt has questions regarding coverage for her surgery today. This would need to be addressed with her insurance company directly and the pt should call her insurance company for coverage. Bedside nurse will explain to pt.     No further needs.    Eloise Carlson RN, BSN CTS  Care Coordinator  [facility]   [phone]

## 2020-12-17 LAB — COPATH REPORT: NORMAL

## 2020-12-30 ENCOUNTER — TELEPHONE (OUTPATIENT)
Dept: SURGERY | Facility: CLINIC | Age: 50
End: 2020-12-30

## 2020-12-30 NOTE — TELEPHONE ENCOUNTER
[facility] Postoperative Call Note:     Kathy Gaspar was called for an update regarding her recovery. She underwent a laparoscopic cholecystectomy by Dr. Lunsford on 12/16/20. Today she tells me she is overall improving. She still has some pain on her right side but is slowly getting better. She is eating a normal diet and her bowels are regular. She states her wounds are healing well.    The pathology revealed chronic cholecystitis and cholelithiasis. This was discussed with the patient.     Patient was instructed to slowly and gradually resume all normal activities. She should be on a low-fat diet and continue this for two weeks. The patient states all of her questions were answered. She understands our discussion. She agrees to follow up as needed or to call our office with any concerns.    Anupam Rosenbaum PA-C      Please route or send letter to:  Primary Care Provider (PCP)

## 2024-06-25 ENCOUNTER — APPOINTMENT (OUTPATIENT)
Dept: GENERAL RADIOLOGY | Facility: CLINIC | Age: 54
End: 2024-06-25
Attending: EMERGENCY MEDICINE
Payer: COMMERCIAL

## 2024-06-25 ENCOUNTER — HOSPITAL ENCOUNTER (EMERGENCY)
Facility: CLINIC | Age: 54
Discharge: HOME | End: 2024-06-25
Attending: EMERGENCY MEDICINE | Admitting: EMERGENCY MEDICINE
Payer: COMMERCIAL

## 2024-06-25 VITALS
HEIGHT: 66 IN | DIASTOLIC BLOOD PRESSURE: 81 MMHG | OXYGEN SATURATION: 99 % | HEART RATE: 68 BPM | RESPIRATION RATE: 18 BRPM | SYSTOLIC BLOOD PRESSURE: 141 MMHG | TEMPERATURE: 98.2 F | WEIGHT: 195 LBS | BODY MASS INDEX: 31.34 KG/M2

## 2024-06-25 DIAGNOSIS — R07.9 CHEST PAIN, UNSPECIFIED TYPE: ICD-10-CM

## 2024-06-25 LAB
ANION GAP SERPL CALCULATED.3IONS-SCNC: 15 MMOL/L (ref 7–15)
BASOPHILS # BLD AUTO: 0 10E3/UL (ref 0–0.2)
BASOPHILS NFR BLD AUTO: 1 %
BUN SERPL-MCNC: 15.5 MG/DL (ref 6–20)
CALCIUM SERPL-MCNC: 9.6 MG/DL (ref 8.6–10)
CHLORIDE SERPL-SCNC: 104 MMOL/L (ref 98–107)
CREAT SERPL-MCNC: 0.46 MG/DL (ref 0.51–0.95)
DEPRECATED HCO3 PLAS-SCNC: 21 MMOL/L (ref 22–29)
EGFRCR SERPLBLD CKD-EPI 2021: >90 ML/MIN/1.73M2
EOSINOPHIL # BLD AUTO: 0.2 10E3/UL (ref 0–0.7)
EOSINOPHIL NFR BLD AUTO: 3 %
ERYTHROCYTE [DISTWIDTH] IN BLOOD BY AUTOMATED COUNT: 13.2 % (ref 10–15)
GLUCOSE SERPL-MCNC: 116 MG/DL (ref 70–99)
HCT VFR BLD AUTO: 43.4 % (ref 35–47)
HGB BLD-MCNC: 14.8 G/DL (ref 11.7–15.7)
HOLD SPECIMEN: NORMAL
HOLD SPECIMEN: NORMAL
IMM GRANULOCYTES # BLD: 0 10E3/UL
IMM GRANULOCYTES NFR BLD: 1 %
LYMPHOCYTES # BLD AUTO: 1.2 10E3/UL (ref 0.8–5.3)
LYMPHOCYTES NFR BLD AUTO: 18 %
MCH RBC QN AUTO: 29.1 PG (ref 26.5–33)
MCHC RBC AUTO-ENTMCNC: 34.1 G/DL (ref 31.5–36.5)
MCV RBC AUTO: 85 FL (ref 78–100)
MONOCYTES # BLD AUTO: 0.4 10E3/UL (ref 0–1.3)
MONOCYTES NFR BLD AUTO: 7 %
NEUTROPHILS # BLD AUTO: 4.8 10E3/UL (ref 1.6–8.3)
NEUTROPHILS NFR BLD AUTO: 72 %
NRBC # BLD AUTO: 0 10E3/UL
NRBC BLD AUTO-RTO: 0 /100
PLATELET # BLD AUTO: 339 10E3/UL (ref 150–450)
POTASSIUM SERPL-SCNC: 3.3 MMOL/L (ref 3.4–5.3)
RBC # BLD AUTO: 5.08 10E6/UL (ref 3.8–5.2)
SODIUM SERPL-SCNC: 140 MMOL/L (ref 135–145)
TROPONIN T SERPL HS-MCNC: <6 NG/L
TROPONIN T SERPL HS-MCNC: <6 NG/L
WBC # BLD AUTO: 6.6 10E3/UL (ref 4–11)

## 2024-06-25 PROCEDURE — 80048 BASIC METABOLIC PNL TOTAL CA: CPT | Performed by: EMERGENCY MEDICINE

## 2024-06-25 PROCEDURE — 84484 ASSAY OF TROPONIN QUANT: CPT | Mod: 91 | Performed by: EMERGENCY MEDICINE

## 2024-06-25 PROCEDURE — 36415 COLL VENOUS BLD VENIPUNCTURE: CPT | Performed by: EMERGENCY MEDICINE

## 2024-06-25 PROCEDURE — 258N000003 HC RX IP 258 OP 636: Mod: JZ | Performed by: EMERGENCY MEDICINE

## 2024-06-25 PROCEDURE — 85025 COMPLETE CBC W/AUTO DIFF WBC: CPT | Performed by: EMERGENCY MEDICINE

## 2024-06-25 PROCEDURE — 71046 X-RAY EXAM CHEST 2 VIEWS: CPT

## 2024-06-25 PROCEDURE — 258N000003 HC RX IP 258 OP 636: Performed by: EMERGENCY MEDICINE

## 2024-06-25 PROCEDURE — 96361 HYDRATE IV INFUSION ADD-ON: CPT

## 2024-06-25 PROCEDURE — 250N000011 HC RX IP 250 OP 636: Mod: JZ | Performed by: EMERGENCY MEDICINE

## 2024-06-25 PROCEDURE — 93005 ELECTROCARDIOGRAM TRACING: CPT | Mod: RTG

## 2024-06-25 PROCEDURE — 84484 ASSAY OF TROPONIN QUANT: CPT | Performed by: EMERGENCY MEDICINE

## 2024-06-25 PROCEDURE — 99285 EMERGENCY DEPT VISIT HI MDM: CPT | Mod: 25

## 2024-06-25 PROCEDURE — 96374 THER/PROPH/DIAG INJ IV PUSH: CPT

## 2024-06-25 PROCEDURE — 93005 ELECTROCARDIOGRAM TRACING: CPT

## 2024-06-25 RX ORDER — KETOROLAC TROMETHAMINE 15 MG/ML
15 INJECTION, SOLUTION INTRAMUSCULAR; INTRAVENOUS ONCE
Status: COMPLETED | OUTPATIENT
Start: 2024-06-25 | End: 2024-06-25

## 2024-06-25 RX ADMIN — SODIUM CHLORIDE 1000 ML: 9 INJECTION, SOLUTION INTRAVENOUS at 11:41

## 2024-06-25 RX ADMIN — KETOROLAC TROMETHAMINE 15 MG: 15 INJECTION, SOLUTION INTRAMUSCULAR; INTRAVENOUS at 16:42

## 2024-06-25 RX ADMIN — SODIUM CHLORIDE 1000 ML: 9 INJECTION, SOLUTION INTRAVENOUS at 16:33

## 2024-06-25 ASSESSMENT — ACTIVITIES OF DAILY LIVING (ADL)
ADLS_ACUITY_SCORE: 35

## 2024-06-25 ASSESSMENT — COLUMBIA-SUICIDE SEVERITY RATING SCALE - C-SSRS
1. IN THE PAST MONTH, HAVE YOU WISHED YOU WERE DEAD OR WISHED YOU COULD GO TO SLEEP AND NOT WAKE UP?: NO
2. HAVE YOU ACTUALLY HAD ANY THOUGHTS OF KILLING YOURSELF IN THE PAST MONTH?: NO
6. HAVE YOU EVER DONE ANYTHING, STARTED TO DO ANYTHING, OR PREPARED TO DO ANYTHING TO END YOUR LIFE?: NO

## 2024-06-25 NOTE — ED PROVIDER NOTES
"  Emergency Department Note      History of Present Illness     Chief Complaint   Chest Pain    HPI   Kathy Gaspar is a 53 year old female who presents to the ED via EMS for evaluation of chest pain. The patient reports that last week she developed chest pain that would come and go. She states she initially felt fine when going to the dentist today, but after the filling she began to feel dizzy and weak. She was given apple juice and went to sit in the waiting room, but her symptoms did not improve. She states she started shaking and she felt more weak. Patient adds that she has been taking 81 mg of aspirin because she was told it would help with circulation. Includes history of prediabetes and hypotension. Denies history of low cholesterol, blood clots, or recent surgery. Denies leg swelling/pain or tobacco use.    Independent Historian   None    Review of External Notes     Past Medical History   Medical History and Problem List   Acute cholecystitis  Glaucoma suspect, bilateral   Endometrial polyp   Iron deficiency anemia   Migraines  Prediabetes      Medications   Roxicodone  Prilosec   Aspirin 81 mg    Surgical History   Laparoscopic cholecystectomy with cholangiograms  Appendectomy  Operative hysteroscopy   Physical Exam   Patient Vitals for the past 24 hrs:   BP Temp Temp src Pulse Resp SpO2 Height Weight   06/25/24 1550 (!) 160/84 -- -- -- -- -- -- --   06/25/24 1543 -- 98.2  F (36.8  C) Oral 68 -- 97 % -- --   06/25/24 1131 (!) 160/97 98.1  F (36.7  C) Oral 79 18 99 % 1.676 m (5' 6\") 88.5 kg (195 lb)     Physical Exam  HENT:      Head: Normocephalic.   Cardiovascular:      Rate and Rhythm: Normal rate and regular rhythm.      Heart sounds: Normal heart sounds.   Pulmonary:      Effort: Pulmonary effort is normal.      Breath sounds: Normal breath sounds.   Musculoskeletal:         General: Normal range of motion.   Skin:     General: Skin is warm.      Capillary Refill: Capillary refill takes less " than 2 seconds.   Neurological:      General: No focal deficit present.      Mental Status: She is alert and oriented to person, place, and time.   Psychiatric:         Mood and Affect: Mood normal.       Diagnostics   Lab Results   Labs Ordered and Resulted from Time of ED Arrival to Time of ED Departure   BASIC METABOLIC PANEL - Abnormal       Result Value    Sodium 140      Potassium 3.3 (*)     Chloride 104      Carbon Dioxide (CO2) 21 (*)     Anion Gap 15      Urea Nitrogen 15.5      Creatinine 0.46 (*)     GFR Estimate >90      Calcium 9.6      Glucose 116 (*)    TROPONIN T, HIGH SENSITIVITY - Normal    Troponin T, High Sensitivity <6     TROPONIN T, HIGH SENSITIVITY - Normal    Troponin T, High Sensitivity <6     CBC WITH PLATELETS AND DIFFERENTIAL    WBC Count 6.6      RBC Count 5.08      Hemoglobin 14.8      Hematocrit 43.4      MCV 85      MCH 29.1      MCHC 34.1      RDW 13.2      Platelet Count 339      % Neutrophils 72      % Lymphocytes 18      % Monocytes 7      % Eosinophils 3      % Basophils 1      % Immature Granulocytes 1      NRBCs per 100 WBC 0      Absolute Neutrophils 4.8      Absolute Lymphocytes 1.2      Absolute Monocytes 0.4      Absolute Eosinophils 0.2      Absolute Basophils 0.0      Absolute Immature Granulocytes 0.0      Absolute NRBCs 0.0       Imaging   Chest XR,  PA & LAT   Final Result   IMPRESSION: Negative chest.        EKG   ECG taken at 1121, ECG read at 1542  Normal sinus rhythm  Nonspecific ST abnormality   Rate 78 bpm. OH interval 142 ms. QRS duration 90 ms. QT/QTc 404/460 ms. P-R-T axes 37 49 35.    Independent Interpretation   I independently reviewed the chest XR and see no pneumothorax or pneumonia  ED Course    Medications Administered   Medications   sodium chloride 0.9% BOLUS 1,000 mL (1,000 mLs Intravenous $New Bag 6/25/24 1141)   sodium chloride 0.9% BOLUS 1,000 mL (0 mLs Intravenous Stopped 6/25/24 1747)   ketorolac (TORADOL) injection 15 mg (15 mg Intravenous  $Given 6/25/24 1642)     Discussion of Management   None    Social Determinants of Health adding to complexity of care   None    ED Course   ED Course as of 06/25/24 1754   Tue Jun 25, 2024   1542 I obtained history and examined the patient as noted above.    1745 I rechecked and updated the patient.      Medical Decision Making / Diagnosis   CMS Diagnoses: None    MIPS       None    MDM   Kathy Gaspar is a 53 year old female who presents with vague chest pain after dental work and describes feeling shaky.  Not at risk for PE and therefore did not evaluate her for this.  Pain seems to be intermittent.  Left parasternal may be musculoskeletal.  EKG and troponins x 2 are negative.  Patient offered reassurance and discharged home suspect musculoskeletal offered reassurance and follow-up with her primary doctor return with worsening condition due to ED boarding overcrowding patient was seen evaluated and discharged from triage    Disposition   The patient was discharged.     ICD-10 Codes:     ICD-10-CM    1. Chest pain, unspecified type  R07.9          Discharge Medications   Discharge Medication List as of 6/25/2024  6:01 PM        Scribe Disclosure:  I, EDSON TORREZ, am serving as a scribe at 3:45 PM on 6/25/2024 to document services personally performed by Zoran Valera MD based on my observations and the provider's statements to me.      Zoran Valera MD  06/26/24 2042

## 2024-06-25 NOTE — ED TRIAGE NOTES
Pt presents via EMS for evaluation of chest pain, dizziness and feeling shaky. Pt was at the dentist, had some fillings done. Was given lidocaine with epi for the procedure. Pt states she started to feel her symptoms as soon as she stood up from after the procedure. Was given 324 mg asa at the dentist. IV established by EMS, . Was initially hypertensive.

## 2024-06-25 NOTE — DISCHARGE INSTRUCTIONS
We have performed a thorough cardiac workup and has been negative.  Please follow-up with your regular doctor for reassessment your blood pressure slightly elevated but does not meet emergency room attention.  Return with chest pain with exertion severe increase in shortness of breath or other concerns.  Thanks for your patience today.

## 2024-06-26 LAB
ATRIAL RATE - MUSE: 78 BPM
DIASTOLIC BLOOD PRESSURE - MUSE: NORMAL MMHG
INTERPRETATION ECG - MUSE: NORMAL
P AXIS - MUSE: 37 DEGREES
PR INTERVAL - MUSE: 142 MS
QRS DURATION - MUSE: 90 MS
QT - MUSE: 404 MS
QTC - MUSE: 460 MS
R AXIS - MUSE: 49 DEGREES
SYSTOLIC BLOOD PRESSURE - MUSE: NORMAL MMHG
T AXIS - MUSE: 35 DEGREES
VENTRICULAR RATE- MUSE: 78 BPM

## (undated) DEVICE — LINEN TOWEL PACK X5 5464

## (undated) DEVICE — SOL NACL 0.9% IRRIG 1000ML BOTTLE 2F7124

## (undated) DEVICE — ESU CORD MONOPOLAR 10'  E0510

## (undated) DEVICE — ENDO TROCAR BLUNT TIP KII BALLOON 12X100MM C0R47

## (undated) DEVICE — DRAPE LEGGINGS 8421

## (undated) DEVICE — RAD RX ISOVUE 300 (50ML) 61% IOPAMIDOL CHARGE PER ML

## (undated) DEVICE — LINEN HALF SHEET 5512

## (undated) DEVICE — SU VICRYL 4-0 PS-2 18" UND J496H

## (undated) DEVICE — SOL NACL 0.9% INJ 250ML BAG 2B1322Q

## (undated) DEVICE — DRAPE C-ARM 60X42" 1013

## (undated) DEVICE — LINEN POUCH DBL 5427

## (undated) DEVICE — SOL NACL 0.9% IRRIG 3000ML BAG 2B7477

## (undated) DEVICE — BLADE KNIFE SURG 11 371111

## (undated) DEVICE — SUCTION IRR STRYKERFLOW II W/TIP 250-070-520

## (undated) DEVICE — CLIP APPLIER ENDO 5MM M/L LIGAMAX EL5ML

## (undated) DEVICE — ENDO TROCAR SLEEVE KII Z-THREADED 05X100MM CTS02

## (undated) DEVICE — ESU PENCIL W/HOLSTER E2350H

## (undated) DEVICE — SYR 30ML LL W/O NDL 302832

## (undated) DEVICE — CONNECTOR STOPCOCK 3 WAY MALE LL HI-FLO MX9311L

## (undated) DEVICE — SU VICRYL 0 UR-6 27" J603H

## (undated) DEVICE — ESU GROUND PAD ADULT W/CORD E7507

## (undated) DEVICE — DECANTER BAG 2002S

## (undated) DEVICE — ESU ELEC BLADE 2.75" COATED/INSULATED E1455

## (undated) DEVICE — ENDO POUCH UNIV RETRIEVAL SYSTEM INZII 10MM CD001

## (undated) DEVICE — EVAC SYSTEM CLEAR FLOW SC082500

## (undated) DEVICE — GLOVE PROTEXIS BLUE W/NEU-THERA 7.0  2D73EB70

## (undated) DEVICE — Device

## (undated) DEVICE — BAG CLEAR TRASH 1.3M 39X33" P4040C

## (undated) DEVICE — GLOVE PROTEXIS MICRO 6.5  2D73PM65

## (undated) DEVICE — CATH CHOLANGIOGRAM KUMAR CC-019

## (undated) DEVICE — ENDO SHEARS EXTRA LONG 5MM 174601

## (undated) DEVICE — SUCTION CANISTER MEDIVAC LINER 3000ML W/LID 65651-530

## (undated) DEVICE — NDL 22GA 1.5"

## (undated) DEVICE — LINEN FULL SHEET 5511

## (undated) DEVICE — ENDO TROCAR FIRST ENTRY KII FIOS Z-THRD 05X100MM CTF03

## (undated) RX ORDER — CEFAZOLIN SODIUM 2 G/100ML
INJECTION, SOLUTION INTRAVENOUS
Status: DISPENSED
Start: 2020-12-16

## (undated) RX ORDER — LIDOCAINE HYDROCHLORIDE 10 MG/ML
INJECTION, SOLUTION EPIDURAL; INFILTRATION; INTRACAUDAL; PERINEURAL
Status: DISPENSED
Start: 2020-12-16

## (undated) RX ORDER — GLYCOPYRROLATE 0.2 MG/ML
INJECTION INTRAMUSCULAR; INTRAVENOUS
Status: DISPENSED
Start: 2020-12-16

## (undated) RX ORDER — ONDANSETRON 2 MG/ML
INJECTION INTRAMUSCULAR; INTRAVENOUS
Status: DISPENSED
Start: 2020-12-16

## (undated) RX ORDER — FENTANYL CITRATE 50 UG/ML
INJECTION, SOLUTION INTRAMUSCULAR; INTRAVENOUS
Status: DISPENSED
Start: 2020-12-16

## (undated) RX ORDER — NEOSTIGMINE METHYLSULFATE 1 MG/ML
VIAL (ML) INJECTION
Status: DISPENSED
Start: 2020-12-16

## (undated) RX ORDER — KETOROLAC TROMETHAMINE 30 MG/ML
INJECTION, SOLUTION INTRAMUSCULAR; INTRAVENOUS
Status: DISPENSED
Start: 2020-12-16

## (undated) RX ORDER — PROPOFOL 10 MG/ML
INJECTION, EMULSION INTRAVENOUS
Status: DISPENSED
Start: 2020-12-16

## (undated) RX ORDER — FENTANYL CITRATE-0.9 % NACL/PF 10 MCG/ML
PLASTIC BAG, INJECTION (ML) INTRAVENOUS
Status: DISPENSED
Start: 2020-12-16

## (undated) RX ORDER — LABETALOL 20 MG/4 ML (5 MG/ML) INTRAVENOUS SYRINGE
Status: DISPENSED
Start: 2020-12-16

## (undated) RX ORDER — EPHEDRINE SULFATE 50 MG/ML
INJECTION, SOLUTION INTRAMUSCULAR; INTRAVENOUS; SUBCUTANEOUS
Status: DISPENSED
Start: 2020-12-16